# Patient Record
Sex: FEMALE | Race: WHITE | Employment: PART TIME | URBAN - METROPOLITAN AREA
[De-identification: names, ages, dates, MRNs, and addresses within clinical notes are randomized per-mention and may not be internally consistent; named-entity substitution may affect disease eponyms.]

---

## 2017-01-09 ENCOUNTER — TELEPHONE (OUTPATIENT)
Dept: OBGYN CLINIC | Facility: CLINIC | Age: 32
End: 2017-01-09

## 2017-01-09 NOTE — TELEPHONE ENCOUNTER
PT FORGOT SHE HAD APPT TODAY. HER CHILDREN WERE SICK WITH THE FLU AND SHE COMPLETELY FORGOT. TRIED TO R/S APPT FOR TOMORROW OR Wednesday, AND SHE SAID SHE WASN'T SURE AND SHED HAVE TO CALL BACK.

## 2017-01-17 ENCOUNTER — ROUTINE PRENATAL (OUTPATIENT)
Dept: OBGYN CLINIC | Facility: CLINIC | Age: 32
End: 2017-01-17

## 2017-01-17 VITALS
HEART RATE: 76 BPM | DIASTOLIC BLOOD PRESSURE: 68 MMHG | SYSTOLIC BLOOD PRESSURE: 106 MMHG | WEIGHT: 145 LBS | BODY MASS INDEX: 27 KG/M2

## 2017-01-17 DIAGNOSIS — Z34.93 ENCOUNTER FOR SUPERVISION OF NORMAL PREGNANCY IN THIRD TRIMESTER, UNSPECIFIED GRAVIDITY: Primary | ICD-10-CM

## 2017-01-17 LAB
APPEARANCE: CLEAR
MULTISTIX LOT#: NORMAL NUMERIC
PH, URINE: 6 (ref 4.5–8)
SPECIFIC GRAVITY: 1 (ref 1–1.03)
URINE-COLOR: YELLOW
UROBILINOGEN,SEMI-QN: 0.2 MG/DL (ref 0–1.9)

## 2017-01-17 PROCEDURE — 90471 IMMUNIZATION ADMIN: CPT | Performed by: ADVANCED PRACTICE MIDWIFE

## 2017-01-17 PROCEDURE — 90715 TDAP VACCINE 7 YRS/> IM: CPT | Performed by: ADVANCED PRACTICE MIDWIFE

## 2017-01-17 NOTE — PROGRESS NOTES
Denies headaches, vision change, swelling. Is a  and  works weekends. Baby is active but doesn't really pay attention. Advised to do 1 hour [facility] daily.     Chart reviewed [id number] LUCERO 3/26/17, Ges Age28 w 2 d, Problem list updated Induced fo

## 2017-02-01 ENCOUNTER — ROUTINE PRENATAL (OUTPATIENT)
Dept: OBGYN CLINIC | Facility: CLINIC | Age: 32
End: 2017-02-01

## 2017-02-01 VITALS — DIASTOLIC BLOOD PRESSURE: 72 MMHG | SYSTOLIC BLOOD PRESSURE: 110 MMHG | WEIGHT: 148 LBS | BODY MASS INDEX: 27 KG/M2

## 2017-02-01 DIAGNOSIS — Z34.92 NORMAL PREGNANCY, SECOND TRIMESTER: Primary | ICD-10-CM

## 2017-02-01 LAB
APPEARANCE: CLEAR
MULTISTIX LOT#: NORMAL NUMERIC
PH, URINE: 6 (ref 4.5–8)
SPECIFIC GRAVITY: 1.02 (ref 1–1.03)
URINE-COLOR: YELLOW
UROBILINOGEN,SEMI-QN: 0 MG/DL (ref 0–1.9)

## 2017-02-01 RX ORDER — PYRIDOXINE HCL (VITAMIN B6) 50 MG
TABLET ORAL
COMMUNITY
End: 2019-04-28

## 2017-03-03 ENCOUNTER — HOSPITAL ENCOUNTER (OUTPATIENT)
Facility: HOSPITAL | Age: 32
Setting detail: OBSERVATION
Discharge: HOME | End: 2017-03-03
Attending: OBSTETRICS & GYNECOLOGY | Admitting: OBSTETRICS & GYNECOLOGY
Payer: COMMERCIAL

## 2017-03-03 VITALS — HEART RATE: 83 BPM | SYSTOLIC BLOOD PRESSURE: 96 MMHG | DIASTOLIC BLOOD PRESSURE: 62 MMHG

## 2017-03-03 PROBLEM — O36.8190 DECREASED FETAL MOVEMENT: Status: ACTIVE | Noted: 2017-03-03

## 2017-03-03 PROBLEM — O36.8190 DECREASED FETAL MOVEMENT (HCC): Status: ACTIVE | Noted: 2017-03-03

## 2017-03-03 PROCEDURE — 59025 FETAL NON-STRESS TEST: CPT | Performed by: OBSTETRICS & GYNECOLOGY

## 2017-03-03 NOTE — TRIAGE
[facility] HOSP - [facility]      Triage Note    Fortunato Robles Patient Status:  Observation    1985 MRN [id number]   Location [address] C-D Attending Shayy Ellison MD   Hosp Day # 0 PCP Laura Glynn.  Tina Decker MD       [facility] Bebe Morgan Means counts reviewed. Encouraged to push fluids for congestion. Patient verbalized understanding. Spencer Gaytan RN  3/3/2017 9:35 AM      NST Reactive. I agree with the above stated findings. Cheko Garces MD

## 2017-03-09 ENCOUNTER — ROUTINE PRENATAL (OUTPATIENT)
Dept: OBGYN CLINIC | Facility: CLINIC | Age: 32
End: 2017-03-09

## 2017-03-09 ENCOUNTER — HOSPITAL ENCOUNTER (OUTPATIENT)
Facility: HOSPITAL | Age: 32
Setting detail: OBSERVATION
Discharge: HOME | End: 2017-03-09
Attending: OBSTETRICS & GYNECOLOGY | Admitting: OBSTETRICS & GYNECOLOGY
Payer: COMMERCIAL

## 2017-03-09 VITALS — DIASTOLIC BLOOD PRESSURE: 67 MMHG | BODY MASS INDEX: 27 KG/M2 | WEIGHT: 147 LBS | SYSTOLIC BLOOD PRESSURE: 112 MMHG

## 2017-03-09 VITALS — SYSTOLIC BLOOD PRESSURE: 109 MMHG | HEART RATE: 67 BPM | DIASTOLIC BLOOD PRESSURE: 72 MMHG

## 2017-03-09 DIAGNOSIS — Z34.93 NORMAL PREGNANCY, THIRD TRIMESTER: Primary | ICD-10-CM

## 2017-03-09 PROBLEM — IMO0002 ABNORMAL FETAL HEART RATE: Status: ACTIVE | Noted: 2017-03-09

## 2017-03-09 LAB
APPEARANCE: CLEAR
MULTISTIX LOT#: NORMAL NUMERIC
PH, URINE: 6 (ref 4.5–8)
SPECIFIC GRAVITY: 1.02 (ref 1–1.03)
UROBILINOGEN,SEMI-QN: 0 MG/DL (ref 0–1.9)

## 2017-03-09 PROCEDURE — 59025 FETAL NON-STRESS TEST: CPT | Performed by: OBSTETRICS & GYNECOLOGY

## 2017-03-09 NOTE — TRIAGE
[facility] HOSP - [facility]      Triage Note    Linda Agosto Patient Status:  Observation    1985 MRN [id number]   Location [address] C-D Attending Helena Sarabia MD   Hosp Day # 0 PCP Patty Boothe.  Sigrid Pali, MD Mitchel Darner Narda BartersJaney Felty

## 2017-03-15 ENCOUNTER — LAB ENCOUNTER (OUTPATIENT)
Dept: LAB | Facility: HOSPITAL | Age: 32
End: 2017-03-15
Attending: OBSTETRICS & GYNECOLOGY
Payer: COMMERCIAL

## 2017-03-15 ENCOUNTER — ROUTINE PRENATAL (OUTPATIENT)
Dept: OBGYN CLINIC | Facility: CLINIC | Age: 32
End: 2017-03-15

## 2017-03-15 VITALS
BODY MASS INDEX: 26 KG/M2 | SYSTOLIC BLOOD PRESSURE: 133 MMHG | HEART RATE: 83 BPM | DIASTOLIC BLOOD PRESSURE: 77 MMHG | WEIGHT: 144 LBS

## 2017-03-15 DIAGNOSIS — Z34.83 OTHER NORMAL PREGNANCY, NOT FIRST, THIRD TRIMESTER: ICD-10-CM

## 2017-03-15 DIAGNOSIS — Z34.83 OTHER NORMAL PREGNANCY, NOT FIRST, THIRD TRIMESTER: Primary | ICD-10-CM

## 2017-03-15 LAB
BASOPHILS # BLD: 0 K/UL (ref 0–0.2)
BASOPHILS NFR BLD: 0 %
EOSINOPHIL # BLD: 0.1 K/UL (ref 0–0.7)
EOSINOPHIL NFR BLD: 1 %
ERYTHROCYTE [DISTWIDTH] IN BLOOD BY AUTOMATED COUNT: 13.2 % (ref 11–15)
HCT VFR BLD AUTO: 36.1 % (ref 35–48)
HGB BLD-MCNC: 12.3 G/DL (ref 12–16)
LYMPHOCYTES # BLD: 1.9 K/UL (ref 1–4)
LYMPHOCYTES NFR BLD: 20 %
MCH RBC QN AUTO: 30 PG (ref 27–32)
MCHC RBC AUTO-ENTMCNC: 34.1 G/DL (ref 32–37)
MCV RBC AUTO: 87.8 FL (ref 80–100)
MONOCYTES # BLD: 0.5 K/UL (ref 0–1)
MONOCYTES NFR BLD: 5 %
MULTISTIX LOT#: NORMAL NUMERIC
NEUTROPHILS # BLD AUTO: 6.7 K/UL (ref 1.8–7.7)
NEUTROPHILS NFR BLD: 73 %
PH, URINE: 6 (ref 4.5–8)
PLATELET # BLD AUTO: 279 K/UL (ref 140–400)
PMV BLD AUTO: 8.1 FL (ref 7.4–10.3)
RBC # BLD AUTO: 4.11 M/UL (ref 3.7–5.4)
SPECIFIC GRAVITY: 1 (ref 1–1.03)
URINE-COLOR: YELLOW
UROBILINOGEN,SEMI-QN: 0 MG/DL (ref 0–1.9)
WBC # BLD AUTO: 9.2 K/UL (ref 4–11)

## 2017-03-15 PROCEDURE — 86780 TREPONEMA PALLIDUM: CPT

## 2017-03-15 PROCEDURE — 85025 COMPLETE CBC W/AUTO DIFF WBC: CPT

## 2017-03-15 PROCEDURE — 36415 COLL VENOUS BLD VENIPUNCTURE: CPT

## 2017-03-17 LAB — T PALLIDUM AB SER QL: NEGATIVE

## 2017-03-23 ENCOUNTER — HOSPITAL ENCOUNTER (OUTPATIENT)
Facility: HOSPITAL | Age: 32
Setting detail: OBSERVATION
Discharge: HOME | End: 2017-03-23
Attending: OBSTETRICS & GYNECOLOGY | Admitting: OBSTETRICS & GYNECOLOGY
Payer: COMMERCIAL

## 2017-03-23 ENCOUNTER — ROUTINE PRENATAL (OUTPATIENT)
Dept: OBGYN CLINIC | Facility: CLINIC | Age: 32
End: 2017-03-23

## 2017-03-23 VITALS — WEIGHT: 146 LBS | DIASTOLIC BLOOD PRESSURE: 79 MMHG | SYSTOLIC BLOOD PRESSURE: 121 MMHG | BODY MASS INDEX: 27 KG/M2

## 2017-03-23 DIAGNOSIS — Z34.83 OTHER NORMAL PREGNANCY, NOT FIRST, THIRD TRIMESTER: Primary | ICD-10-CM

## 2017-03-23 LAB
APPEARANCE: CLEAR
BILIRUBIN: NEGATIVE
GLUCOSE (URINE DIPSTICK): NEGATIVE MG/DL
KETONES (URINE DIPSTICK): NEGATIVE MG/DL
LEUKOCYTES: NEGATIVE
MULTISTIX LOT#: NORMAL NUMERIC
NITRITE, URINE: NEGATIVE
OCCULT BLOOD: NEGATIVE
PH, URINE: 7 (ref 4.5–8)
PROTEIN (URINE DIPSTICK): NEGATIVE MG/DL
SPECIFIC GRAVITY: 1 (ref 1–1.03)
URINE-COLOR: YELLOW
UROBILINOGEN,SEMI-QN: 0 MG/DL (ref 0–1.9)

## 2017-03-23 PROCEDURE — 59025 FETAL NON-STRESS TEST: CPT | Performed by: OBSTETRICS & GYNECOLOGY

## 2017-03-23 NOTE — NST
Nonstress Test   Patient: Sorin Old    Gestation: 82A9B    NST:Low fetal baseline in office       Variability: Moderate           Accelerations: Yes           Decelerations: None            Baseline: 110 BPM           Uterine Irritability: No

## 2017-03-23 NOTE — PROGRESS NOTES
Good fm. No c/o. Labor/rom precautions  To [facility]BC for nst for low fhr, poss low normal baseline.   Has happened once before

## 2017-03-27 ENCOUNTER — ROUTINE PRENATAL (OUTPATIENT)
Dept: OBGYN CLINIC | Facility: CLINIC | Age: 32
End: 2017-03-27

## 2017-03-27 VITALS
BODY MASS INDEX: 27 KG/M2 | HEART RATE: 80 BPM | WEIGHT: 146.19 LBS | DIASTOLIC BLOOD PRESSURE: 77 MMHG | SYSTOLIC BLOOD PRESSURE: 120 MMHG

## 2017-03-27 DIAGNOSIS — Z34.83 ENCOUNTER FOR SUPERVISION OF OTHER NORMAL PREGNANCY IN THIRD TRIMESTER: Primary | ICD-10-CM

## 2017-03-27 LAB
APPEARANCE: CLEAR
MULTISTIX LOT#: NORMAL NUMERIC
PH, URINE: 7 (ref 4.5–8)
SPECIFIC GRAVITY: 1.01 (ref 1–1.03)
URINE-COLOR: YELLOW
UROBILINOGEN,SEMI-QN: 0.2 MG/DL (ref 0–1.9)

## 2017-03-27 NOTE — PROGRESS NOTES
[facility], [facility]  Obstetrics and Gynecology  Prenatal Visit  Rei Lee MD    KAL Phoenix is a 27year old.o. [id number] 40w1d weeks. Here for routine prenatal visit and is without complaints.   Patient denies any regular uterine contractions, s

## 2017-03-31 ENCOUNTER — HOSPITAL ENCOUNTER (OUTPATIENT)
Facility: HOSPITAL | Age: 32
Discharge: HOME | End: 2017-03-31
Attending: OBSTETRICS & GYNECOLOGY | Admitting: OBSTETRICS & GYNECOLOGY
Payer: COMMERCIAL

## 2017-03-31 ENCOUNTER — TELEPHONE (OUTPATIENT)
Dept: OBGYN CLINIC | Facility: CLINIC | Age: 32
End: 2017-03-31

## 2017-03-31 ENCOUNTER — HOSPITAL ENCOUNTER (OUTPATIENT)
Dept: OBGYN CLINIC | Facility: HOSPITAL | Age: 32
Discharge: HOME | End: 2017-03-31
Payer: COMMERCIAL

## 2017-03-31 VITALS — HEART RATE: 79 BPM | SYSTOLIC BLOOD PRESSURE: 106 MMHG | DIASTOLIC BLOOD PRESSURE: 62 MMHG

## 2017-03-31 PROCEDURE — 99212 OFFICE O/P EST SF 10 MIN: CPT | Performed by: OBSTETRICS & GYNECOLOGY

## 2017-03-31 PROCEDURE — 59025 FETAL NON-STRESS TEST: CPT | Performed by: OBSTETRICS & GYNECOLOGY

## 2017-03-31 PROCEDURE — 76815 OB US LIMITED FETUS(S): CPT | Performed by: OBSTETRICS & GYNECOLOGY

## 2017-03-31 NOTE — NST
Nonstress Test   Patient: Juanjo Hinojosa    Gestation: 66K1F    NST:       Variability: Moderate           Accelerations: Yes           Decelerations: None            Baseline: 110 BPM           Uterine Irritability: Yes           Contractions: Irregular

## 2017-03-31 NOTE — TELEPHONE ENCOUNTER
Patient was confused about her cherelle at [facility], everything clear now.  No further questions at this time

## 2017-04-03 ENCOUNTER — ANESTHESIA EVENT (OUTPATIENT)
Dept: OBGYN CLINIC | Facility: HOSPITAL | Age: 32
End: 2017-04-03
Payer: COMMERCIAL

## 2017-04-03 ENCOUNTER — HOSPITAL ENCOUNTER (INPATIENT)
Facility: HOSPITAL | Age: 32
LOS: 1 days | Discharge: HOME | End: 2017-04-04
Attending: OBSTETRICS & GYNECOLOGY | Admitting: OBSTETRICS & GYNECOLOGY
Payer: COMMERCIAL

## 2017-04-03 ENCOUNTER — HOSPITAL ENCOUNTER (INPATIENT)
Dept: OBGYN CLINIC | Facility: HOSPITAL | Age: 32
Discharge: HOME | End: 2017-04-03
Payer: COMMERCIAL

## 2017-04-03 ENCOUNTER — ANESTHESIA (OUTPATIENT)
Dept: OBGYN CLINIC | Facility: HOSPITAL | Age: 32
End: 2017-04-03
Payer: COMMERCIAL

## 2017-04-03 PROBLEM — Z34.90 PREGNANCY (HCC): Status: ACTIVE | Noted: 2017-04-03

## 2017-04-03 PROBLEM — Z34.90 PREGNANCY: Status: ACTIVE | Noted: 2017-04-03

## 2017-04-03 PROCEDURE — 59025 FETAL NON-STRESS TEST: CPT | Performed by: OBSTETRICS & GYNECOLOGY

## 2017-04-03 PROCEDURE — 59400 OBSTETRICAL CARE: CPT | Performed by: OBSTETRICS & GYNECOLOGY

## 2017-04-03 RX ORDER — SODIUM CHLORIDE 0.9 % (FLUSH) 0.9 %
10 SYRINGE (ML) INJECTION AS NEEDED
Status: DISCONTINUED | OUTPATIENT
Start: 2017-04-03 | End: 2017-04-04

## 2017-04-03 RX ORDER — DIAPER,BRIEF,INFANT-TODD,DISP
1 EACH MISCELLANEOUS EVERY 6 HOURS PRN
Status: DISCONTINUED | OUTPATIENT
Start: 2017-04-03 | End: 2017-04-04

## 2017-04-03 RX ORDER — IBUPROFEN 600 MG/1
600 TABLET ORAL ONCE AS NEEDED
Status: DISCONTINUED | OUTPATIENT
Start: 2017-04-03 | End: 2017-04-03

## 2017-04-03 RX ORDER — EPHEDRINE SULFATE/0.9% NACL/PF 25 MG/5 ML
SYRINGE (ML) INTRAVENOUS
Status: DISCONTINUED
Start: 2017-04-03 | End: 2017-04-03 | Stop reason: WASHOUT

## 2017-04-03 RX ORDER — DEXTROSE, SODIUM CHLORIDE, SODIUM LACTATE, POTASSIUM CHLORIDE, AND CALCIUM CHLORIDE 5; .6; .31; .03; .02 G/100ML; G/100ML; G/100ML; G/100ML; G/100ML
125 INJECTION, SOLUTION INTRAVENOUS CONTINUOUS
Status: DISCONTINUED | OUTPATIENT
Start: 2017-04-03 | End: 2017-04-03

## 2017-04-03 RX ORDER — AMMONIA INHALANTS 0.04 G/.3ML
0.3 INHALANT RESPIRATORY (INHALATION) AS NEEDED
Status: DISCONTINUED | OUTPATIENT
Start: 2017-04-03 | End: 2017-04-03

## 2017-04-03 RX ORDER — SODIUM CHLORIDE, SODIUM LACTATE, POTASSIUM CHLORIDE, CALCIUM CHLORIDE 600; 310; 30; 20 MG/100ML; MG/100ML; MG/100ML; MG/100ML
INJECTION, SOLUTION INTRAVENOUS
Status: COMPLETED
Start: 2017-04-03 | End: 2017-04-03

## 2017-04-03 RX ORDER — EPHEDRINE SULFATE/0.9% NACL/PF 25 MG/5 ML
5 SYRINGE (ML) INTRAVENOUS AS NEEDED
Status: DISCONTINUED | OUTPATIENT
Start: 2017-04-03 | End: 2017-04-03

## 2017-04-03 RX ORDER — BISACODYL 10 MG
10 SUPPOSITORY, RECTAL RECTAL ONCE AS NEEDED
Status: ACTIVE | OUTPATIENT
Start: 2017-04-03 | End: 2017-04-03

## 2017-04-03 RX ORDER — LIDOCAINE HYDROCHLORIDE AND EPINEPHRINE 20; 5 MG/ML; UG/ML
INJECTION, SOLUTION EPIDURAL; INFILTRATION; INTRACAUDAL; PERINEURAL
Status: DISPENSED
Start: 2017-04-03 | End: 2017-04-04

## 2017-04-03 RX ORDER — ONDANSETRON 2 MG/ML
4 INJECTION INTRAMUSCULAR; INTRAVENOUS EVERY 6 HOURS PRN
Status: DISCONTINUED | OUTPATIENT
Start: 2017-04-03 | End: 2017-04-04

## 2017-04-03 RX ORDER — PHENYLEPHRINE HCL IN 0.9% NACL 0.5 MG/5ML
SYRINGE (ML) INTRAVENOUS
Status: DISCONTINUED
Start: 2017-04-03 | End: 2017-04-03 | Stop reason: WASHOUT

## 2017-04-03 RX ORDER — SODIUM CHLORIDE 0.9 % (FLUSH) 0.9 %
10 SYRINGE (ML) INJECTION AS NEEDED
Status: DISCONTINUED | OUTPATIENT
Start: 2017-04-03 | End: 2017-04-03

## 2017-04-03 RX ORDER — PRENATAL VIT,CAL 76/IRON/FOLIC 29 MG-1 MG
1 TABLET ORAL DAILY
Status: DISCONTINUED | OUTPATIENT
Start: 2017-04-03 | End: 2017-04-04

## 2017-04-03 RX ORDER — AMMONIA INHALANTS 0.04 G/.3ML
0.3 INHALANT RESPIRATORY (INHALATION) AS NEEDED
Status: DISCONTINUED | OUTPATIENT
Start: 2017-04-03 | End: 2017-04-04

## 2017-04-03 RX ORDER — LIDOCAINE HYDROCHLORIDE 10 MG/ML
30 INJECTION, SOLUTION EPIDURAL; INFILTRATION; INTRACAUDAL; PERINEURAL ONCE
Status: DISCONTINUED | OUTPATIENT
Start: 2017-04-03 | End: 2017-04-03

## 2017-04-03 RX ORDER — ONDANSETRON 2 MG/ML
4 INJECTION INTRAMUSCULAR; INTRAVENOUS EVERY 6 HOURS PRN
Status: DISCONTINUED | OUTPATIENT
Start: 2017-04-03 | End: 2017-04-03

## 2017-04-03 RX ORDER — 0.9 % SODIUM CHLORIDE 0.9 %
VIAL (ML) INJECTION
Status: DISPENSED
Start: 2017-04-03 | End: 2017-04-04

## 2017-04-03 RX ORDER — ZOLPIDEM TARTRATE 5 MG/1
5 TABLET ORAL NIGHTLY PRN
Status: DISCONTINUED | OUTPATIENT
Start: 2017-04-03 | End: 2017-04-04

## 2017-04-03 RX ORDER — BUPIVACAINE HYDROCHLORIDE 2.5 MG/ML
INJECTION, SOLUTION EPIDURAL; INFILTRATION; INTRACAUDAL
Status: COMPLETED
Start: 2017-04-03 | End: 2017-04-03

## 2017-04-03 RX ORDER — FLUTICASONE PROPIONATE 50 MCG
2 SPRAY, SUSPENSION (ML) NASAL DAILY
COMMUNITY
End: 2019-04-28

## 2017-04-03 RX ORDER — SIMETHICONE 80 MG
80 TABLET,CHEWABLE ORAL 3 TIMES DAILY PRN
Status: DISCONTINUED | OUTPATIENT
Start: 2017-04-03 | End: 2017-04-04

## 2017-04-03 RX ORDER — TERBUTALINE SULFATE 1 MG/ML
0.25 INJECTION, SOLUTION SUBCUTANEOUS AS NEEDED
Status: DISCONTINUED | OUTPATIENT
Start: 2017-04-03 | End: 2017-04-03

## 2017-04-03 RX ORDER — DOCUSATE SODIUM 100 MG/1
100 CAPSULE, LIQUID FILLED ORAL 2 TIMES DAILY
Status: DISCONTINUED | OUTPATIENT
Start: 2017-04-03 | End: 2017-04-04

## 2017-04-03 RX ORDER — ONDANSETRON 2 MG/ML
INJECTION INTRAMUSCULAR; INTRAVENOUS
Status: DISPENSED
Start: 2017-04-03 | End: 2017-04-04

## 2017-04-03 RX ORDER — NALBUPHINE HCL 10 MG/ML
2.5 AMPUL (ML) INJECTION
Status: DISCONTINUED | OUTPATIENT
Start: 2017-04-03 | End: 2017-04-04

## 2017-04-03 RX ORDER — DEXTROSE, SODIUM CHLORIDE, SODIUM LACTATE, POTASSIUM CHLORIDE, AND CALCIUM CHLORIDE 5; .6; .31; .03; .02 G/100ML; G/100ML; G/100ML; G/100ML; G/100ML
INJECTION, SOLUTION INTRAVENOUS
Status: DISCONTINUED
Start: 2017-04-03 | End: 2017-04-03

## 2017-04-03 RX ORDER — IBUPROFEN 600 MG/1
600 TABLET ORAL EVERY 6 HOURS PRN
Status: DISCONTINUED | OUTPATIENT
Start: 2017-04-03 | End: 2017-04-04

## 2017-04-03 RX ADMIN — BUPIVACAINE HYDROCHLORIDE 10 ML: 2.5 INJECTION, SOLUTION EPIDURAL; INFILTRATION; INTRACAUDAL at 14:30:00

## 2017-04-03 NOTE — ANESTHESIA PREPROCEDURE EVALUATION
Anesthesia PreOp Note    HPI:     Victor Manuel Esteban is a 27year old female who presents for preoperative consultation requested by: * No surgeons listed *    Date of Surgery: 4/3/2017    * No procedures listed *  Indication: * No pre-op diagnosis entered * Idalia Alexis MD Last Rate: 125 mL/hr at 04/03/17 0643 125 mL/hr at 04/03/17 0643   Lidocaine HCl (PF) (XYLOCAINE) 1 % injection SOLN 30 mL 30 mL Intradermal Once Bruce Woodard MD     ibuprofen (MOTRIN) tab 600 mg 600 mg Oral Once PRN Bruce Woodard MD Cancer Maternal Grandfather        Social History   Marital Status:   Spouse Name: N/A    Years of Education: N/A  Number of Children: N/A     Occupational History  None on file     Social History Main Topics   Smoking status: Never Smoker     Smoke

## 2017-04-03 NOTE — L&D DELIVERY NOTE
[facility] - [facility]    Vaginal Delivery Note    Maria Isabel Gonzalez Patient Status:  Inpatient    1985 MRN [id number]   Location [facility] Attending Anai Holliday, Adrianne Cabezas MD   [address] [facility] Day # 0 PCP Karrie Holliday MD     Delivery

## 2017-04-03 NOTE — ANESTHESIA PROCEDURE NOTES
Labor Analgesia  Performed by: Polly Haynes  Authorized by: Polly Haynes    Patient Location:  OB  Start Time:  4/3/2017 2:27 PM  End Time:  4/3/2017 2:31 PM  Site Identification: surface landmarks    Anesthesiologist:  Polly Haynes  Perform

## 2017-04-03 NOTE — ANESTHESIA POSTPROCEDURE EVALUATION
Patient: Tim Spencer    Procedure Summary     Date Anesthesia Start Anesthesia Stop Room / Location    04/03/17 [address] [facility] Obstetrics Outpatient       Procedure Diagnosis Scheduled Providers Responsible Provider    FBC INDUCTION No diagnos

## 2017-04-03 NOTE — H&P
[address] [facility] Patient Status:  Inpatient    1985 MRN [id number]   Location [facility] Attending Rk Arredondo, Yuliya Boles MD   [facility] Day # 0 PCP Teresa Arredondo MD     Date of Admis file    Alcohol Use: No        Allergies/Medications: Allergies:     Seasonal                Itching    Comment:Congestion  Medications:    Prescriptions prior to admission:  Cyanocobalamin (B-12) 100 MCG Oral Tab Take by mouth.  Disp:  Rfl:  4/2/2017 at 03/27/2017   UROBILINOGEN <2.0 09/08/2016   LEUUR Negative 09/08/2016               Assessment/Plan:    Ivone Valerio is at an estimated gestational age of 40w1d with an estimated date of delivery of:  3/26/2017, by Ultrasound    Not in labor.   Obstetric

## 2017-04-04 VITALS
DIASTOLIC BLOOD PRESSURE: 54 MMHG | RESPIRATION RATE: 16 BRPM | HEART RATE: 70 BPM | TEMPERATURE: 98 F | WEIGHT: 146 LBS | SYSTOLIC BLOOD PRESSURE: 96 MMHG | BODY MASS INDEX: 27 KG/M2

## 2017-04-04 NOTE — LACTATION NOTE
This note was copied from the chart of Jewel Orantes.   LACTATION NOTE - INFANT    Evaluation Type  Evaluation Type: Inpatient    Problems & Assessment  Problems: comment/detail: post dates  Infant Assessment: Skin color: pink or appropriate for ethnicity  Mus able to express milk. She has done the same with her other kids. Discussed how to relieve some of the engorgement.   Spike Agustin, 04/04/2017, 3:52 PM

## 2017-04-04 NOTE — PROGRESS NOTES
Patient up to bathroom with assist x 2. Voided 450 ml Unable to void at this time. Patient transferred to mother/baby room # [id number] per wheelchair in stable condition with baby and personal belongings. Accompanied by sister and staff.   Report given to jesenia

## 2017-04-04 NOTE — LACTATION NOTE
LACTATION NOTE - MOTHER           Problems identified  Problems identified: Knowledge deficit    Maternal history  Maternal history: Induction of labor  Other/comment: post dates, hx HPV, LGSIL    Breastfeeding goal  Breastfeeding goal: To maintain breast

## 2017-04-04 NOTE — PROGRESS NOTES
Ppd 1  Pt doing well, pt wants discharge at 24 hr post delivery. Alert and oriented, nad    abd soft, nt, ff  Ext nt    A/p ppd 1 pt given discharge instructions,  All questions answered. Hb 11, stable. F/u office 6 weeks.

## 2017-04-04 NOTE — PLAN OF CARE
Problem: POSTPARTUM  Goal: Optimize infant feeding at the breast  INTERVENTIONS:  - Initiate breast feeding within first hour after birth. - Monitor effectiveness of current breast feeding efforts. - Assess support systems available to mother/family.   - the first baby. Mom is going back to work as a  in 16 days and she will not be able to express milk. She has done the same with her other kids. Discussed how to relieve some of the engorgement.     Problem: BREAST FEEDING  Goal: Optimize infant fee expression. Breastfeeding teaching and handouts reviewed. Mom states her last baby was constantly on the breast for food and comfort, sometimes for 4 hours straight, and had rapid growth. No problems nursing the first baby.   Mom is going back to work as

## 2017-04-04 NOTE — PLAN OF CARE
ANXIETY    • Will report anxiety at manageable levels Completed        BREAST FEEDING    • Optimize infant feeding at the breast Completed        COPING    • Pt/Family able to verbalize concerns and demonstrate effective coping strategies Completed

## 2017-04-18 ENCOUNTER — TELEPHONE (OUTPATIENT)
Dept: OBGYN CLINIC | Facility: CLINIC | Age: 32
End: 2017-04-18

## 2017-04-18 NOTE — TELEPHONE ENCOUNTER
Per MLM patient advised okay to walk, aware no to  anything heavy, full clearance will be given at her 6weeks, no further questions at this time

## 2017-04-18 NOTE — TELEPHONE ENCOUNTER
marcial 4/3/17 by  [facility] when can she work, and has questions about movement / picking up things. Bleeding stopped almost completely.

## 2017-04-19 ENCOUNTER — TELEPHONE (OUTPATIENT)
Dept: OBGYN CLINIC | Facility: CLINIC | Age: 32
End: 2017-04-19

## 2017-04-19 RX ORDER — AMOXICILLIN AND CLAVULANATE POTASSIUM 500; 125 MG/1; MG/1
1 TABLET, FILM COATED ORAL EVERY 8 HOURS
Qty: 21 TABLET | Refills: 0 | Status: SHIPPED | OUTPATIENT
Start: 2017-04-19 | End: 2017-04-26

## 2017-04-20 ENCOUNTER — OFFICE VISIT (OUTPATIENT)
Dept: OBGYN CLINIC | Facility: CLINIC | Age: 32
End: 2017-04-20

## 2017-04-20 VITALS
DIASTOLIC BLOOD PRESSURE: 72 MMHG | BODY MASS INDEX: 24 KG/M2 | HEART RATE: 87 BPM | WEIGHT: 130 LBS | SYSTOLIC BLOOD PRESSURE: 112 MMHG

## 2017-04-20 DIAGNOSIS — M54.9 OTHER ACUTE BACK PAIN: ICD-10-CM

## 2017-04-20 DIAGNOSIS — N12 PYELONEPHRITIS: ICD-10-CM

## 2017-04-20 DIAGNOSIS — R10.2 PELVIC PAIN IN FEMALE: Primary | ICD-10-CM

## 2017-04-20 PROCEDURE — 99214 OFFICE O/P EST MOD 30 MIN: CPT | Performed by: OBSTETRICS & GYNECOLOGY

## 2017-04-20 NOTE — TELEPHONE ENCOUNTER
Back pain started last night, today has some abdominal pain, no fever temp 98.4, had the chills and shivers, has frequency, no dysuria  Per [facility] Augmentin 500 q8 hours x7 days  #21, aappt made for tomorrow, for pt to hydrate a lot and to keep an eye on temp

## 2017-04-21 NOTE — PROGRESS NOTES
HPI:   Shani Kapadia is a 27year old female who presents for an appt due to lower/mid back pain and pelvic pain started 2 days ago,  Called office yesterday stating she had a kidney infection before that felt this way and was started on augmentin last n Hypertension Maternal Grandmother    • Skin cancer Maternal Grandmother      basal cell   • Lipids Maternal Grandmother    • Skin cancer Brother      melanoma   • Diabetes Brother    • Psychiatric Maternal Uncle      mental illness, paranoid schizophrenia days. Conservative mgmt counseled. Well woman counseling. . Self breast exam explained. Health maintenance. Body mass index is 23.77 kg/(m^2). , recommended low fat diet and aerobic exercise 30 minutes three times weekly.   The patient indicates Makinen

## 2017-04-26 ENCOUNTER — TELEPHONE (OUTPATIENT)
Dept: OBGYN CLINIC | Facility: CLINIC | Age: 32
End: 2017-04-26

## 2017-04-26 NOTE — TELEPHONE ENCOUNTER
Informed pt that, per Dr Paz Castellanos, symptoms seem normal. She is to call if she is saturating a pad in an hour, passing heavy clots, fever or foul odor. Pt verbalizes understanding.

## 2017-04-26 NOTE — TELEPHONE ENCOUNTER
She delivered 3 weeks ago and had some light pink bleeding since then. Over the weekend she was more active and now the bleeding is bright red but the same amount.  Please advise

## 2017-04-26 NOTE — TELEPHONE ENCOUNTER
Delivered her baby 3 weeks ago. Pt took her kids to the zoo this weekend and she she noticed her lochia went from pink to bright red. Small amount-25% of pad every 2 hours. No clots, afebrile. No pain or cramps.  Informed pt that vaginal bleeding seems norm

## 2017-04-26 NOTE — TELEPHONE ENCOUNTER
I agree that symptoms sound normal for 3 weeks postpartum. If she has heavy bleeding, dizziness or other symptoms she should come in for evaluation.

## 2017-05-15 ENCOUNTER — POSTPARTUM (OUTPATIENT)
Dept: OBGYN CLINIC | Facility: CLINIC | Age: 32
End: 2017-05-15

## 2017-05-15 VITALS
BODY MASS INDEX: 25 KG/M2 | WEIGHT: 135 LBS | HEART RATE: 82 BPM | SYSTOLIC BLOOD PRESSURE: 119 MMHG | DIASTOLIC BLOOD PRESSURE: 73 MMHG

## 2017-05-15 DIAGNOSIS — Z30.09 ENCOUNTER FOR OTHER GENERAL COUNSELING OR ADVICE ON CONTRACEPTION: ICD-10-CM

## 2017-05-15 DIAGNOSIS — G43.009 MIGRAINE WITHOUT AURA AND WITHOUT STATUS MIGRAINOSUS, NOT INTRACTABLE: Primary | ICD-10-CM

## 2017-05-15 PROCEDURE — 99213 OFFICE O/P EST LOW 20 MIN: CPT | Performed by: OBSTETRICS & GYNECOLOGY

## 2017-05-15 NOTE — PROGRESS NOTES
HPI:   Linda Agosto is a 27year old female who presents for a pp visit/contraception consult/pt with mild back pain in area of epidural insertion.  Pt counseled on pain clinic at [facility],  Pt stated she will call if persists, only feels mild discomfort when mental illness, paranoid schizophrenia   • Colon Cancer Maternal Grandfather       Social History:     Smoking Status: Never Smoker                      Alcohol Use: No                     REVIEW OF SYSTEMS:   GENERAL: feels well otherwise  SKIN: jeanette Body mass index is 24.69 kg/(m^2). , recommended low fat diet and aerobic exercise 30 minutes three times weekly. The patient indicates understanding of these issues and agrees to the plan. The patient is asked to return for mirena insertion 1 week.

## 2017-05-22 ENCOUNTER — OFFICE VISIT (OUTPATIENT)
Dept: OBGYN CLINIC | Facility: CLINIC | Age: 32
End: 2017-05-22

## 2017-05-22 VITALS
WEIGHT: 133 LBS | SYSTOLIC BLOOD PRESSURE: 99 MMHG | DIASTOLIC BLOOD PRESSURE: 64 MMHG | HEART RATE: 53 BPM | BODY MASS INDEX: 24 KG/M2

## 2017-05-22 DIAGNOSIS — Z30.09 ENCOUNTER FOR OTHER GENERAL COUNSELING OR ADVICE ON CONTRACEPTION: ICD-10-CM

## 2017-05-22 DIAGNOSIS — Z30.430 ENCOUNTER FOR IUD INSERTION: Primary | ICD-10-CM

## 2017-05-22 DIAGNOSIS — N91.2 LACTATIONAL AMENORRHEA: ICD-10-CM

## 2017-05-22 PROCEDURE — 58300 INSERT INTRAUTERINE DEVICE: CPT | Performed by: OBSTETRICS & GYNECOLOGY

## 2017-05-22 PROCEDURE — 81025 URINE PREGNANCY TEST: CPT | Performed by: OBSTETRICS & GYNECOLOGY

## 2017-05-22 PROCEDURE — 99213 OFFICE O/P EST LOW 20 MIN: CPT | Performed by: OBSTETRICS & GYNECOLOGY

## 2017-05-23 NOTE — PROGRESS NOTES
HPI:   Nancy Fine is a 27year old female who presents for a contraception consult and poss iud insertion. Pt counseled on options of contraception, risks/benefits/alternatives counseled, all questions answered.       Wt Readings from Last 6 Encounter REVIEW OF SYSTEMS:   GENERAL: feels well otherwise  SKIN: denies any unusual skin lesions  EYES:denies blurred vision or double vision  HEENT: denies nasal congestion, sinus pain or ST  LUNGS: denies shortness of breath with exertion  CARDIOVASC indicates understanding of these issues and agrees to the plan. The patient is asked to return for f/u 6 weeks.

## 2017-05-27 ENCOUNTER — TELEPHONE (OUTPATIENT)
Dept: OBGYN CLINIC | Facility: CLINIC | Age: 32
End: 2017-05-27

## 2017-06-27 ENCOUNTER — TELEPHONE (OUTPATIENT)
Dept: PEDIATRICS CLINIC | Facility: CLINIC | Age: 32
End: 2017-06-27

## 2017-06-27 NOTE — TELEPHONE ENCOUNTER
PT HAS IUD PLACED 2 DAYS LATER SHE HAD SPOTTING .  THEN SHE STARTED BLEEDING  AND  SAID IT COULD BE HER MENSES , PT HAS NOW BEEN BLEEDING FOR  6 WEEKS , AND BAD PAINFUL RASH UNDER BOTH ARM PITS ,

## 2017-06-27 NOTE — TELEPHONE ENCOUNTER
ROBERTOI  Patient concerned about intermittent  bleeding with an IUD, was told its normal until her body will get used to the hormones. Denies any pain, no cramping. She has a rash under her arm pits get slightly irritated with heat, was advised to make an cherelle with her PC to be evaluated. Patient agrees with the plan, she will give an try with an IUD for couple more weeks,no further questions at this time.

## 2017-08-07 ENCOUNTER — OFFICE VISIT (OUTPATIENT)
Dept: OBGYN CLINIC | Facility: CLINIC | Age: 32
End: 2017-08-07

## 2017-08-07 VITALS
DIASTOLIC BLOOD PRESSURE: 76 MMHG | WEIGHT: 139.63 LBS | SYSTOLIC BLOOD PRESSURE: 110 MMHG | BODY MASS INDEX: 26 KG/M2 | HEART RATE: 68 BPM

## 2017-08-07 DIAGNOSIS — Z97.5 IUD (INTRAUTERINE DEVICE) IN PLACE: ICD-10-CM

## 2017-08-07 DIAGNOSIS — Z30.431 IUD CHECK UP: Primary | ICD-10-CM

## 2017-08-07 DIAGNOSIS — N92.6 IRREGULAR MENSTRUAL BLEEDING: ICD-10-CM

## 2017-08-07 PROCEDURE — 99214 OFFICE O/P EST MOD 30 MIN: CPT | Performed by: OBSTETRICS & GYNECOLOGY

## 2017-08-07 NOTE — PROGRESS NOTES
HPI:   Lena Landaverde is a 27year old female who presents for a f/u IUD mirena. Pt had some irregular bleeding, none currently. Pt counseled, pt wants to continue with mirena iud.         Wt Readings from Last 6 Encounters:  08/07/17 : 139 lb 9.6 oz (6 REVIEW OF SYSTEMS:   GENERAL: feels well otherwise  SKIN: denies any unusual skin lesions  EYES:denies blurred vision or double vision  HEENT: denies nasal congestion, sinus pain or ST  LUNGS: denies shortness of breath with exertion  CARDIOVASC

## 2017-10-05 ENCOUNTER — TELEPHONE (OUTPATIENT)
Dept: OBGYN CLINIC | Facility: CLINIC | Age: 32
End: 2017-10-05

## 2017-10-11 ENCOUNTER — OFFICE VISIT (OUTPATIENT)
Dept: OBGYN CLINIC | Facility: CLINIC | Age: 32
End: 2017-10-11

## 2017-10-11 VITALS
SYSTOLIC BLOOD PRESSURE: 100 MMHG | BODY MASS INDEX: 26.13 KG/M2 | WEIGHT: 142 LBS | HEIGHT: 62 IN | DIASTOLIC BLOOD PRESSURE: 68 MMHG

## 2017-10-11 DIAGNOSIS — Z01.812 PRE-PROCEDURAL LABORATORY EXAMINATION: Primary | ICD-10-CM

## 2017-10-11 DIAGNOSIS — Z30.432 ENCOUNTER FOR REMOVAL OF INTRAUTERINE CONTRACEPTIVE DEVICE: ICD-10-CM

## 2017-10-11 PROCEDURE — 58301 REMOVE INTRAUTERINE DEVICE: CPT | Performed by: ADVANCED PRACTICE MIDWIFE

## 2017-10-11 PROCEDURE — 81025 URINE PREGNANCY TEST: CPT | Performed by: ADVANCED PRACTICE MIDWIFE

## 2017-10-11 NOTE — PROCEDURES
IUD Removal     Pregnancy Results: negative from urine test   Birth control method(s) used:  ; date last used:    Consent signed. Procedure discussed with the patient in detail including indication, risks, benefits, alternatives and complications.     Pelv

## 2019-04-28 ENCOUNTER — OFFICE VISIT (OUTPATIENT)
Dept: FAMILY MEDICINE CLINIC | Facility: CLINIC | Age: 34
End: 2019-04-28
Payer: COMMERCIAL

## 2019-04-28 VITALS
SYSTOLIC BLOOD PRESSURE: 102 MMHG | DIASTOLIC BLOOD PRESSURE: 70 MMHG | WEIGHT: 135 LBS | HEART RATE: 78 BPM | BODY MASS INDEX: 23.92 KG/M2 | HEIGHT: 63 IN | TEMPERATURE: 99 F | RESPIRATION RATE: 14 BRPM

## 2019-04-28 DIAGNOSIS — J02.9 ACUTE PHARYNGITIS, UNSPECIFIED ETIOLOGY: Primary | ICD-10-CM

## 2019-04-28 DIAGNOSIS — Z20.818 EXPOSURE TO STREP THROAT: ICD-10-CM

## 2019-04-28 DIAGNOSIS — R59.0 CERVICAL LYMPHADENOPATHY: ICD-10-CM

## 2019-04-28 PROCEDURE — 99202 OFFICE O/P NEW SF 15 MIN: CPT | Performed by: PHYSICIAN ASSISTANT

## 2019-04-28 PROCEDURE — 87880 STREP A ASSAY W/OPTIC: CPT | Performed by: PHYSICIAN ASSISTANT

## 2019-04-28 PROCEDURE — 87081 CULTURE SCREEN ONLY: CPT | Performed by: PHYSICIAN ASSISTANT

## 2019-04-28 RX ORDER — LEVOCETIRIZINE DIHYDROCHLORIDE 5 MG/1
5 TABLET, FILM COATED ORAL EVERY EVENING
COMMUNITY
End: 2020-05-01

## 2019-04-28 RX ORDER — AMOXICILLIN 875 MG/1
875 TABLET, COATED ORAL 2 TIMES DAILY
Qty: 20 TABLET | Refills: 0 | Status: SHIPPED | OUTPATIENT
Start: 2019-04-28 | End: 2019-05-08

## 2019-04-28 NOTE — PROGRESS NOTES
CHIEF COMPLAINT:   Patient presents with:  Sore Throat: concerned about strep infection      HPI:   Tim Spencer is a 28year old female who presents to clinic with symptoms of sore throat. Patient has had for 12 hours.  Symptoms have been rapidly worsen EXAM:   /70   Pulse 78   Temp 98.6 °F (37 °C)   Resp 14   Ht 63\"   Wt 135 lb   LMP 04/02/2019 (Exact Date)   Breastfeeding?  No   BMI 23.91 kg/m²   GENERAL: well developed, well nourished,in no apparent distress  SKIN: no rashes,no suspicious lesions Sig: Take 1 tablet (875 mg total) by mouth 2 (two) times daily for 10 days. Meds as listed above. Comfort measures as described in Patient Instructions. Risks, benefits, complications and side effects of meds discussed with patient.      OTC Ty · If the test is positive for strep, you or your child should not go to work or school for the first 2 days of taking the antibiotics. After this time, you or your child will not be contagious.  You or your child can then return to work or school when Terry Machado Call your healthcare provider right away if any of these occur:  · Fever as directed by your healthcare provider. For children, seek care if:  ? Your child is of any age and has repeated fevers above 104°F (40°C). ?  Your child is younger than 2 years of a

## 2019-04-28 NOTE — PATIENT INSTRUCTIONS
Pharyngitis (Sore Throat), Report Pending    Pharyngitis (sore throat) is often due to a virus. It can also be caused by streptococcus (strep), bacteria. This is often called strep throat.  Both viral and strep infections can cause throat pain that is w · Use throat lozenges or numbing throat sprays to help reduce pain. Gargling with warm salt water will also help reduce throat pain. Dissolve 1/2 teaspoon of salt in 1 glass of warm water. Children can sip on juice or a popsicle.  Children 5 years and older · •Can’t swallow liquids, a lot of drooling, or can’t open mouth wide due to throat pain  · Signs of dehydration, such as very dark urine or no urine, sunken eyes, dizziness  · Trouble breathing or noisy breathing  · Muffled voice  · New rash  · Other symp

## 2019-07-17 ENCOUNTER — HOSPITAL ENCOUNTER (OUTPATIENT)
Age: 34
Discharge: HOME | End: 2019-07-17
Payer: COMMERCIAL

## 2019-07-17 VITALS
RESPIRATION RATE: 20 BRPM | SYSTOLIC BLOOD PRESSURE: 98 MMHG | HEART RATE: 68 BPM | TEMPERATURE: 98 F | OXYGEN SATURATION: 99 % | WEIGHT: 135 LBS | HEIGHT: 62 IN | BODY MASS INDEX: 24.84 KG/M2 | DIASTOLIC BLOOD PRESSURE: 66 MMHG

## 2019-07-17 DIAGNOSIS — N30.90 CYSTITIS: Primary | ICD-10-CM

## 2019-07-17 LAB
B-HCG UR QL: NEGATIVE
BILIRUB UR QL STRIP: NEGATIVE
CLARITY UR: CLEAR
COLOR UR: YELLOW
GLUCOSE UR STRIP-MCNC: NEGATIVE MG/DL
KETONES UR STRIP-MCNC: NEGATIVE MG/DL
NITRITE UR QL STRIP: NEGATIVE
PH UR STRIP: 6 [PH]
PROT UR STRIP-MCNC: NEGATIVE MG/DL
SP GR UR STRIP: >=1.03
UROBILINOGEN UR STRIP-ACNC: <2 MG/DL

## 2019-07-17 PROCEDURE — 81002 URINALYSIS NONAUTO W/O SCOPE: CPT

## 2019-07-17 PROCEDURE — 87088 URINE BACTERIA CULTURE: CPT | Performed by: NURSE PRACTITIONER

## 2019-07-17 PROCEDURE — 99204 OFFICE O/P NEW MOD 45 MIN: CPT

## 2019-07-17 PROCEDURE — 87186 SC STD MICRODIL/AGAR DIL: CPT | Performed by: NURSE PRACTITIONER

## 2019-07-17 PROCEDURE — 99214 OFFICE O/P EST MOD 30 MIN: CPT

## 2019-07-17 PROCEDURE — 81025 URINE PREGNANCY TEST: CPT

## 2019-07-17 PROCEDURE — 87086 URINE CULTURE/COLONY COUNT: CPT | Performed by: NURSE PRACTITIONER

## 2019-07-17 RX ORDER — CEPHALEXIN 500 MG/1
500 CAPSULE ORAL 2 TIMES DAILY
Qty: 14 CAPSULE | Refills: 0 | Status: SHIPPED | OUTPATIENT
Start: 2019-07-17 | End: 2019-07-24

## 2019-07-17 NOTE — ED PROVIDER NOTES
Patient presents with:  Urinary Symptoms (urologic)      HPI:     Denise Mcgill is a 28year old female who presents with chief complaint of dysuria, urgency and frequency which started on Saturday evening. Also reports low back pain. No flank pain.   [place] Culture, Routine Once      POCT Urine Dip      POCT Pregnancy, Urine      cephALEXin 500 MG Oral Cap          Sig: Take 1 capsule (500 mg total) by mouth 2 (two) times daily for 7 days.           Dispense:  14 capsule          Refill:  0      Labs performed

## 2019-07-23 ENCOUNTER — OFFICE VISIT (OUTPATIENT)
Dept: FAMILY MEDICINE CLINIC | Facility: CLINIC | Age: 34
End: 2019-07-23
Payer: COMMERCIAL

## 2019-07-23 VITALS
BODY MASS INDEX: 22.45 KG/M2 | RESPIRATION RATE: 15 BRPM | DIASTOLIC BLOOD PRESSURE: 72 MMHG | HEART RATE: 92 BPM | HEIGHT: 63 IN | WEIGHT: 126.69 LBS | SYSTOLIC BLOOD PRESSURE: 106 MMHG | TEMPERATURE: 98 F

## 2019-07-23 DIAGNOSIS — Z30.011 ORAL CONTRACEPTION INITIAL PRESCRIPTION: Primary | ICD-10-CM

## 2019-07-23 DIAGNOSIS — Z30.011 ENCOUNTER FOR INITIAL PRESCRIPTION OF CONTRACEPTIVE PILLS: ICD-10-CM

## 2019-07-23 LAB
KIT LOT #: NORMAL NUMERIC
PREGNANCY TEST, URINE: NEGATIVE

## 2019-07-23 PROCEDURE — 81025 URINE PREGNANCY TEST: CPT | Performed by: PHYSICIAN ASSISTANT

## 2019-07-23 PROCEDURE — 99202 OFFICE O/P NEW SF 15 MIN: CPT | Performed by: PHYSICIAN ASSISTANT

## 2019-07-23 RX ORDER — NORGESTIMATE AND ETHINYL ESTRADIOL 7DAYSX3 28
1 KIT ORAL DAILY
Qty: 1 PACKAGE | Refills: 11 | Status: SHIPPED | OUTPATIENT
Start: 2019-07-23 | End: 2019-08-20 | Stop reason: WASHOUT

## 2019-07-23 NOTE — ASSESSMENT & PLAN NOTE
Start Ortho Tri Cyclen : 1 tablet PO QD. Advise patient to take it starting this Sunday. Patient verbalized understanding.

## 2019-07-23 NOTE — PROGRESS NOTES
HPI:     HPI  28year-old female is here in the office requesting BCP. Patient had IUD removal 1 and 1/2 years ago. Patient states that she used to take BCP before with no side effects. Her LMP was yesterday.   Patient denies of chest pain, SOB, N/V/C/D, fe Grandfather        Social History:   Social History    Socioeconomic History      Marital status:       Spouse name: Not on file      Number of children: Not on file      Years of education: Not on file      Highest education level: Not on file    O file      Review of Systems:   Review of Systems   Constitutional: Negative. Negative for activity change, chills, fatigue and fever. HENT: Negative. Respiratory: Negative. Negative for cough, chest tightness, shortness of breath and wheezing.     Ca TEST (Completed)          Discussed plan of care with pt and pt is in agreement. All questions answered. Pt to call with questions or concerns. Encouraged to sign up for My Chart if not already registered.

## 2019-12-24 ENCOUNTER — OFFICE VISIT (OUTPATIENT)
Dept: FAMILY MEDICINE CLINIC | Facility: CLINIC | Age: 34
End: 2019-12-24
Payer: COMMERCIAL

## 2019-12-24 VITALS
BODY MASS INDEX: 23.92 KG/M2 | SYSTOLIC BLOOD PRESSURE: 110 MMHG | RESPIRATION RATE: 18 BRPM | DIASTOLIC BLOOD PRESSURE: 73 MMHG | TEMPERATURE: 98 F | HEIGHT: 63 IN | WEIGHT: 135 LBS | HEART RATE: 59 BPM

## 2019-12-24 DIAGNOSIS — R35.89 POLYURIA: ICD-10-CM

## 2019-12-24 DIAGNOSIS — N30.00 ACUTE CYSTITIS WITHOUT HEMATURIA: Primary | ICD-10-CM

## 2019-12-24 PROCEDURE — 81003 URINALYSIS AUTO W/O SCOPE: CPT | Performed by: PHYSICIAN ASSISTANT

## 2019-12-24 PROCEDURE — 99213 OFFICE O/P EST LOW 20 MIN: CPT | Performed by: PHYSICIAN ASSISTANT

## 2019-12-24 RX ORDER — SULFAMETHOXAZOLE AND TRIMETHOPRIM 800; 160 MG/1; MG/1
1 TABLET ORAL 2 TIMES DAILY
Qty: 14 TABLET | Refills: 0 | Status: SHIPPED | OUTPATIENT
Start: 2019-12-24 | End: 2019-12-26 | Stop reason: ALTCHOICE

## 2019-12-24 NOTE — ASSESSMENT & PLAN NOTE
Start Bactrim DS PO BID for 7 days. · Complete full course of antibiotics. Will send the urine for culture if needed and we will call with results. · Over the counter Tylenol/Motrin as needed for pain/discomfort.   · Follow up in 3-4 days if symptoms do

## 2019-12-24 NOTE — PROGRESS NOTES
HPI:     Dysuria    This is a new problem. The current episode started yesterday. The problem occurs every urination. The problem has been unchanged. The quality of the pain is described as burning. There has been no fever.  There is no history of pyeloneph Social History    Socioeconomic History      Marital status:       Spouse name: Not on file      Number of children: Not on file      Years of education: Not on file      Highest education level: Not on file    Occupational History      Not on alvin Constitutional: Negative for activity change, chills, fatigue and fever. HENT: Negative for congestion, ear discharge, ear pain, postnasal drip, rhinorrhea, sinus pressure and sore throat.     Respiratory: Negative for cough, chest tightness, shortness affect. Assessment and Plan[de-identified]     Problem List Items Addressed This Visit        Genitourinary    Acute cystitis without hematuria - Primary     Start Bactrim DS PO BID for 7 days. · Complete full course of antibiotics.   Will send the urine for cultur

## 2019-12-26 ENCOUNTER — TELEPHONE (OUTPATIENT)
Dept: INTERNAL MEDICINE CLINIC | Facility: CLINIC | Age: 34
End: 2019-12-26

## 2019-12-26 ENCOUNTER — TELEPHONE (OUTPATIENT)
Dept: FAMILY MEDICINE CLINIC | Facility: CLINIC | Age: 34
End: 2019-12-26

## 2019-12-26 NOTE — TELEPHONE ENCOUNTER
Attempted to contact the pt regarding her test results but unable to leave voicemail due to not st up. It pt return call please send to [phone] today until 3:30 and ask to speak to Tyshawn Delgado. Thank you.

## 2019-12-26 NOTE — TELEPHONE ENCOUNTER
----- Message from Leeanna Butterfield PA-C sent at 12/26/2019  9:36 AM CST -----  Urine culture showed UTI. Please discontinue Bactrim DS. Will switch to Macrobid 100 mg PO BID for 7 days. Rx sent to pharmacy.

## 2019-12-28 NOTE — TELEPHONE ENCOUNTER
Pt calling back and advised of PA message below. Pt verb understanding and agrees to plan. Vaccine Information Statement(s) was given today. This has been reviewed, questions answered, and verbal consent given by Patient for injection(s) and administration of Influenza (Inactivated).    1. Does the patient have a moderate to severe fever?  No  2. Has the patient had a serious reaction to a flu shot before?   No  3. Has the patient ever had Guillian Natchez Syndrome within 6 weeks of a previous flu shot?  No  4. Is the patient less than 6 months of age?  No    Patient is eligible to receive the vaccine based on all questions being answered as 'No'.    Patient tolerated without incident. See immunization grid for documentation.

## 2020-01-25 ENCOUNTER — HOSPITAL ENCOUNTER (OUTPATIENT)
Age: 35
Discharge: HOME | End: 2020-01-25
Attending: EMERGENCY MEDICINE
Payer: OTHER GOVERNMENT

## 2020-01-25 VITALS
RESPIRATION RATE: 21 BRPM | DIASTOLIC BLOOD PRESSURE: 64 MMHG | HEART RATE: 66 BPM | OXYGEN SATURATION: 97 % | HEIGHT: 63 IN | TEMPERATURE: 98 F | BODY MASS INDEX: 23.04 KG/M2 | SYSTOLIC BLOOD PRESSURE: 98 MMHG | WEIGHT: 130 LBS

## 2020-01-25 DIAGNOSIS — N92.1 BREAKTHROUGH BLEEDING ON BIRTH CONTROL PILLS: Primary | ICD-10-CM

## 2020-01-25 LAB — B-HCG UR QL: NEGATIVE

## 2020-01-25 PROCEDURE — 87205 SMEAR GRAM STAIN: CPT | Performed by: EMERGENCY MEDICINE

## 2020-01-25 PROCEDURE — 81025 URINE PREGNANCY TEST: CPT

## 2020-01-25 PROCEDURE — 87106 FUNGI IDENTIFICATION YEAST: CPT | Performed by: EMERGENCY MEDICINE

## 2020-01-25 PROCEDURE — 87591 N.GONORRHOEAE DNA AMP PROB: CPT | Performed by: EMERGENCY MEDICINE

## 2020-01-25 PROCEDURE — 99214 OFFICE O/P EST MOD 30 MIN: CPT

## 2020-01-25 PROCEDURE — 87491 CHLMYD TRACH DNA AMP PROBE: CPT | Performed by: EMERGENCY MEDICINE

## 2020-01-25 PROCEDURE — 87808 TRICHOMONAS ASSAY W/OPTIC: CPT | Performed by: EMERGENCY MEDICINE

## 2020-01-25 RX ORDER — ONDANSETRON 4 MG/1
4 TABLET, ORALLY DISINTEGRATING ORAL EVERY 4 HOURS PRN
Qty: 10 TABLET | Refills: 0 | Status: SHIPPED | OUTPATIENT
Start: 2020-01-25 | End: 2020-02-01

## 2020-01-25 NOTE — ED PROVIDER NOTES
Patient Seen in: [address]      History   Patient presents with:  Shauna    Stated Complaint: vag bleeding/poss std    HPI    Patient is a 78-year-old female with history of HPV, had postcoital bleeding yesterday with pe nontender, nondistended, no hepatosplenomegaly  Pelvic:  External: No lesions no discharge  Speculum exam: Minimal discharge noted, white  Bimanual exam: Adnexa nontender, no cervical motion tenderness  Extremities: No calf tenderness or edema  Skin: Good

## 2020-01-25 NOTE — ED INITIAL ASSESSMENT (HPI)
Vaginal bleeding post intercourse yesterday, with pelvic pain, googled her symptoms and is concerned about chlamydia, denies unusual vaginal discharge, denies urinary symptoms

## 2020-01-26 LAB
C TRACH DNA SPEC QL NAA+PROBE: NEGATIVE
N GONORRHOEA DNA SPEC QL NAA+PROBE: NEGATIVE

## 2020-01-27 LAB
GENITAL VAGINOSIS SCREEN: NEGATIVE
TRICHOMONAS SCREEN: NEGATIVE

## 2020-01-27 NOTE — ED NOTES
Patient called on updated on labs from [facility]- STD testing. Patient stated she is continuing to have heavy vaginal bleeding. Patient states she has been dizzy and pale. Advised patient to go to ED. She refused to go now due to work.  Discussed reasons she n

## 2020-02-26 ENCOUNTER — HOSPITAL ENCOUNTER (OUTPATIENT)
Age: 35
Discharge: HOME | End: 2020-02-26
Attending: EMERGENCY MEDICINE
Payer: OTHER GOVERNMENT

## 2020-02-26 VITALS
HEART RATE: 66 BPM | HEIGHT: 62 IN | SYSTOLIC BLOOD PRESSURE: 101 MMHG | DIASTOLIC BLOOD PRESSURE: 69 MMHG | TEMPERATURE: 98 F | OXYGEN SATURATION: 99 % | WEIGHT: 135 LBS | BODY MASS INDEX: 24.84 KG/M2 | RESPIRATION RATE: 16 BRPM

## 2020-02-26 DIAGNOSIS — J02.0 STREP PHARYNGITIS: Primary | ICD-10-CM

## 2020-02-26 LAB — S PYO AG THROAT QL: POSITIVE

## 2020-02-26 PROCEDURE — 87430 STREP A AG IA: CPT

## 2020-02-26 PROCEDURE — 99214 OFFICE O/P EST MOD 30 MIN: CPT

## 2020-02-26 PROCEDURE — 99213 OFFICE O/P EST LOW 20 MIN: CPT

## 2020-02-26 RX ORDER — AMOXICILLIN 875 MG/1
875 TABLET, COATED ORAL 2 TIMES DAILY
Qty: 20 TABLET | Refills: 0 | Status: SHIPPED | OUTPATIENT
Start: 2020-02-26 | End: 2020-03-07

## 2020-02-26 RX ORDER — NORGESTIMATE AND ETHINYL ESTRADIOL 7DAYSX3 28
KIT ORAL
COMMUNITY
Start: 2020-02-10 | End: 2020-08-25

## 2020-02-26 NOTE — ED PROVIDER NOTES
Patient Seen in: [address] [facility]      History   Patient presents with:  Sore Throat    Stated Complaint: Sore throat    HPI    Patient complains of sore throat which started today.   Patient stated she has had a cough which is STREP - Abnormal; Notable for the following components:       Result Value    POCT Rapid Strep Positive (*)     All other components within normal limits               MDM     Did not think further testing was indicated.   Will place on amoxicillin

## 2020-03-01 ENCOUNTER — APPOINTMENT (OUTPATIENT)
Dept: GENERAL RADIOLOGY | Age: 35
End: 2020-03-01
Attending: EMERGENCY MEDICINE
Payer: OTHER GOVERNMENT

## 2020-03-01 ENCOUNTER — HOSPITAL ENCOUNTER (OUTPATIENT)
Age: 35
Discharge: HOME | End: 2020-03-01
Attending: EMERGENCY MEDICINE
Payer: OTHER GOVERNMENT

## 2020-03-01 VITALS
BODY MASS INDEX: 24.84 KG/M2 | DIASTOLIC BLOOD PRESSURE: 84 MMHG | WEIGHT: 135 LBS | OXYGEN SATURATION: 97 % | RESPIRATION RATE: 20 BRPM | HEIGHT: 62 IN | HEART RATE: 77 BPM | TEMPERATURE: 98 F | SYSTOLIC BLOOD PRESSURE: 120 MMHG

## 2020-03-01 DIAGNOSIS — S60.222A CONTUSION OF LEFT HAND, INITIAL ENCOUNTER: Primary | ICD-10-CM

## 2020-03-01 PROCEDURE — 99213 OFFICE O/P EST LOW 20 MIN: CPT

## 2020-03-01 PROCEDURE — 73130 X-RAY EXAM OF HAND: CPT | Performed by: EMERGENCY MEDICINE

## 2020-03-01 NOTE — ED PROVIDER NOTES
Patient Seen in: [address]      History   Patient presents with:  Upper Extremity Injury    Stated Complaint: finger pain    HPI    Patient is a 39-year-old female without contributory past medical history presents with third metacarpal  Nontender palmar aspect of the hand  Digits nontender    ED Course   Labs Reviewed - No data to display       No evidence of fracture        MDM     We will treat locally with the patient follow-up with PMD in 1 week repeat evaluation.
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
## 2020-03-28 ENCOUNTER — TELEPHONE (OUTPATIENT)
Dept: FAMILY MEDICINE CLINIC | Facility: CLINIC | Age: 35
End: 2020-03-28

## 2020-03-30 NOTE — TELEPHONE ENCOUNTER
Paging    Message # [phone] [phone] 10:57a [[email or web address]]  To:  From: ZAIDA Becerra MD:  Phone#:  ----------------------------------------------------------------------  PT [address] [phone],  84, PT HS CONCERNS OF STREP    Paged at number :

## 2020-04-10 ENCOUNTER — TELEPHONE (OUTPATIENT)
Dept: FAMILY MEDICINE CLINIC | Facility: CLINIC | Age: 35
End: 2020-04-10

## 2020-04-10 ENCOUNTER — PATIENT MESSAGE (OUTPATIENT)
Dept: FAMILY MEDICINE CLINIC | Facility: CLINIC | Age: 35
End: 2020-04-10

## 2020-04-10 DIAGNOSIS — J03.90 ACUTE TONSILLITIS, UNSPECIFIED ETIOLOGY: Primary | ICD-10-CM

## 2020-04-10 DIAGNOSIS — B37.3 CANDIDIASIS OF VAGINA: ICD-10-CM

## 2020-04-10 PROCEDURE — 99441 PHONE E/M BY PHYS 5-10 MIN: CPT | Performed by: PHYSICIAN ASSISTANT

## 2020-04-10 RX ORDER — METHYLPREDNISOLONE 4 MG/1
TABLET ORAL
Qty: 1 KIT | Refills: 0 | Status: SHIPPED | OUTPATIENT
Start: 2020-04-10 | End: 2020-05-01

## 2020-04-10 RX ORDER — AMOXICILLIN AND CLAVULANATE POTASSIUM 875; 125 MG/1; MG/1
1 TABLET, FILM COATED ORAL 2 TIMES DAILY
Qty: 20 TABLET | Refills: 0 | Status: SHIPPED | OUTPATIENT
Start: 2020-04-10 | End: 2020-04-20

## 2020-04-10 RX ORDER — FLUCONAZOLE 150 MG/1
TABLET ORAL
Qty: 2 TABLET | Refills: 0 | Status: SHIPPED | OUTPATIENT
Start: 2020-04-10 | End: 2020-05-01

## 2020-04-10 NOTE — TELEPHONE ENCOUNTER
Virtual Telephone Check-In    Cassius Chávez verbally consents to a Virtual/Telephone Check-In visit on 04/10/20. Patient understands and accepts financial responsibility for any deductible, co-insurance and/or co-pays associated with this service.

## 2020-04-10 NOTE — TELEPHONE ENCOUNTER
----- Message from Maame Metzger sent at 4/10/2020 12:43 PM CDT -----  Regarding: Prescription Question  Contact: [phone]  hi I had a few questions. .... 12 days ago I was prescribed antibiotics but now have a yeast infection and was wondering if I c

## 2020-04-10 NOTE — TELEPHONE ENCOUNTER
Patient reports contacted the on-call provider 3/28 was given script for strep throat, reports symptoms had improved but returned today.  Had fever this morning temp 100, has pain with swallow, feels lymph nodes swollen and tender, has white spots in the ba

## 2020-04-10 NOTE — TELEPHONE ENCOUNTER
From: Lena Landaverde  To: Carmen Beach PA-C  Sent: 4/10/2020 12:43 PM CDT  Subject: Prescription Question    hi I had a few questions. .... 12 days ago I was prescribed antibiotics but now have a yeast infection and was wondering if I could have a pres

## 2020-04-17 RX ORDER — FLUCONAZOLE 150 MG/1
TABLET ORAL
Qty: 2 TABLET | Refills: 0 | OUTPATIENT
Start: 2020-04-17

## 2020-04-30 ENCOUNTER — TELEPHONE (OUTPATIENT)
Dept: FAMILY MEDICINE CLINIC | Facility: CLINIC | Age: 35
End: 2020-04-30

## 2020-04-30 NOTE — TELEPHONE ENCOUNTER
Calling with update had augmentin 4/10/20 finished and was feeling ok. Started with sore throat, looks red and swollen again, with little white dots on back of throat and tonsils. 4/29/20  No fever.  No cough, body aches, and fatigued   Thinks she has strep

## 2020-04-30 NOTE — TELEPHONE ENCOUNTER
States she cannot come in to office has no one to watch her children. Please advise if Tele visit would be ok.

## 2020-05-01 ENCOUNTER — OFFICE VISIT (OUTPATIENT)
Dept: FAMILY MEDICINE CLINIC | Facility: CLINIC | Age: 35
End: 2020-05-01
Payer: OTHER GOVERNMENT

## 2020-05-01 VITALS
BODY MASS INDEX: 25 KG/M2 | DIASTOLIC BLOOD PRESSURE: 65 MMHG | RESPIRATION RATE: 18 BRPM | WEIGHT: 137 LBS | TEMPERATURE: 98 F | SYSTOLIC BLOOD PRESSURE: 109 MMHG | HEART RATE: 77 BPM

## 2020-05-01 DIAGNOSIS — J02.0 STREPTOCOCCAL PHARYNGITIS: Primary | ICD-10-CM

## 2020-05-01 DIAGNOSIS — J02.9 SORETHROAT: ICD-10-CM

## 2020-05-01 PROCEDURE — G0463 HOSPITAL OUTPT CLINIC VISIT: HCPCS | Performed by: PHYSICIAN ASSISTANT

## 2020-05-01 PROCEDURE — 87880 STREP A ASSAY W/OPTIC: CPT | Performed by: PHYSICIAN ASSISTANT

## 2020-05-01 PROCEDURE — 99213 OFFICE O/P EST LOW 20 MIN: CPT | Performed by: PHYSICIAN ASSISTANT

## 2020-05-01 RX ORDER — AMOXICILLIN AND CLAVULANATE POTASSIUM 875; 125 MG/1; MG/1
1 TABLET, FILM COATED ORAL 2 TIMES DAILY
Qty: 20 TABLET | Refills: 0 | Status: SHIPPED | OUTPATIENT
Start: 2020-05-01 | End: 2020-05-11

## 2020-05-01 NOTE — PROGRESS NOTES
HPI:     Sore Throat    This is a recurrent problem. The current episode started yesterday. The problem has been unchanged. There has been no fever.  Pertinent negatives include no abdominal pain, congestion, coughing, diarrhea, ear discharge, ear pain, hea Marital status:       Spouse name: Not on file      Number of children: Not on file      Years of education: Not on file      Highest education level: Not on file    Occupational History      Not on file    Social Needs      Financial resource strai chills, fatigue and fever. HENT: Positive for sore throat. Negative for congestion, ear discharge, ear pain, hoarse voice, postnasal drip, rhinorrhea and sinus pressure.     Respiratory: Negative for cough, chest tightness, shortness of breath, wheezing a tonsil. Assessment and Plan[de-identified]     Problem List Items Addressed This Visit        Infectious/Inflammatory    Streptococcal pharyngitis - Primary     Start Augmentin 875 mg PO BID for 10 days.  Supportive care includes:    • encourage fluid intake   • Advi

## 2020-05-02 PROBLEM — J02.0 STREPTOCOCCAL PHARYNGITIS: Status: ACTIVE | Noted: 2020-05-02

## 2020-05-02 NOTE — ASSESSMENT & PLAN NOTE
Start Augmentin 875 mg PO BID for 10 days. Supportive care includes:    • encourage fluid intake   • Advise patient to take Tylenol/Ibuprofen as needed for pain. • warm salt water gargles   • Dispose toothbrush after 3 days uses.

## 2020-05-03 ENCOUNTER — MOBILE ENCOUNTER (OUTPATIENT)
Dept: FAMILY MEDICINE CLINIC | Facility: CLINIC | Age: 35
End: 2020-05-03

## 2020-05-03 RX ORDER — FLUCONAZOLE 200 MG/1
200 TABLET ORAL
Qty: 2 TABLET | Refills: 0 | Status: SHIPPED | OUTPATIENT
Start: 2020-05-03 | End: 2020-09-09 | Stop reason: ALTCHOICE

## 2020-05-14 ENCOUNTER — TELEPHONE (OUTPATIENT)
Dept: FAMILY MEDICINE CLINIC | Facility: CLINIC | Age: 35
End: 2020-05-14

## 2020-05-14 DIAGNOSIS — J03.01 RECURRENT STREPTOCOCCAL TONSILLITIS: Primary | ICD-10-CM

## 2020-05-14 RX ORDER — CLINDAMYCIN HYDROCHLORIDE 300 MG/1
300 CAPSULE ORAL 3 TIMES DAILY
Qty: 30 CAPSULE | Refills: 0 | Status: SHIPPED | OUTPATIENT
Start: 2020-05-14 | End: 2020-05-24

## 2020-05-14 RX ORDER — FLUCONAZOLE 150 MG/1
TABLET ORAL
Qty: 2 TABLET | Refills: 0 | Status: SHIPPED | OUTPATIENT
Start: 2020-05-14 | End: 2020-09-09 | Stop reason: ALTCHOICE

## 2020-05-14 NOTE — TELEPHONE ENCOUNTER
Called and discussed with patient regarding her symptoms. Patient states that she finished Augmentin 875 mg on 05/10, sore throat came back after she stopped taking antibiotic. Patient states that the symptoms are the same when she was at [address]    Plan: Clind

## 2020-05-14 NOTE — TELEPHONE ENCOUNTER
Pt calling to report she still has sore throat, \"and now my tonsils are swollen and there are white spots on them\"    Pt states she had tested positive for strep, and has completed three rounds of antibiotics. Pt seeking recommendations.     Please advis

## 2020-05-19 ENCOUNTER — NURSE TRIAGE (OUTPATIENT)
Dept: FAMILY MEDICINE CLINIC | Facility: CLINIC | Age: 35
End: 2020-05-19

## 2020-05-19 ENCOUNTER — VIRTUAL PHONE E/M (OUTPATIENT)
Dept: FAMILY MEDICINE CLINIC | Facility: CLINIC | Age: 35
End: 2020-05-19
Payer: OTHER GOVERNMENT

## 2020-05-19 DIAGNOSIS — J39.9 UPPER RESPIRATORY DISEASE: Primary | ICD-10-CM

## 2020-05-19 PROCEDURE — 99441 PHONE E/M BY PHYS 5-10 MIN: CPT | Performed by: PHYSICIAN ASSISTANT

## 2020-05-19 NOTE — TELEPHONE ENCOUNTER
Patient was diagnosed with strep on 5/1/2020 and on clindamycin. Patient had a sore throat, fevers, swollen tonsils, dry cough, and body aches. No longer having fevers. Patient on day 4 of antibiotic. Still has a dry cough. No other symptoms.   Patient fou

## 2020-05-19 NOTE — PROGRESS NOTES
HPI: HPI    I spoke Ivone Valerio by telephone , verified date of birth, and discussed current concerns.   Onset/duration of current symptoms: 3 days    Common COVID-19 symptoms per CDC: CDC Clinical Guidance  • Fever:     Yes []     No [x]       • Co Family History   Problem Relation Age of Onset   • Musculo-skelatal Disorder Mother         multiple sclerosis   • Stroke Mother    • Lipids Mother    • Hypertension Maternal Grandmother    • Skin cancer Maternal Grandmother         basal cell   • Lipi Caffeine Concern: Yes          coffee, soda 1 cup        Occupational Exposure: Not Asked        Hobby Hazards: Not Asked        Sleep Concern: Not Asked        Stress Concern: Not Asked        Weight Concern: Not Asked        Special Diet: Not Asked appropriate physical exam nor face-to-face examination, which may limit and/or reduce diagnostic accuracy. Otf Real was informed and agrees that this telemedicine visit will charged.  Otf Real expresses understanding, accepts these limitations

## 2020-05-20 ENCOUNTER — LAB ENCOUNTER (OUTPATIENT)
Dept: LAB | Facility: HOSPITAL | Age: 35
End: 2020-05-20
Attending: PHYSICIAN ASSISTANT
Payer: OTHER GOVERNMENT

## 2020-05-20 DIAGNOSIS — J39.9 UPPER RESPIRATORY DISEASE: ICD-10-CM

## 2020-06-27 ENCOUNTER — PATIENT MESSAGE (OUTPATIENT)
Dept: FAMILY MEDICINE CLINIC | Facility: CLINIC | Age: 35
End: 2020-06-27

## 2020-06-28 ENCOUNTER — TELEPHONE (OUTPATIENT)
Dept: FAMILY MEDICINE CLINIC | Facility: CLINIC | Age: 35
End: 2020-06-28

## 2020-08-25 RX ORDER — NORGESTIMATE AND ETHINYL ESTRADIOL 7DAYSX3 28
KIT ORAL
Qty: 28 TABLET | Refills: 1 | Status: SHIPPED | OUTPATIENT
Start: 2020-08-25 | End: 2020-09-09

## 2020-08-25 NOTE — TELEPHONE ENCOUNTER
The patient is calling and asking for a refill and if she can have additional refills. She has 3 children starting school and e learning. She will call back to schedule an appointment in about a month when her mother can help.

## 2020-09-09 ENCOUNTER — OFFICE VISIT (OUTPATIENT)
Dept: FAMILY MEDICINE CLINIC | Facility: CLINIC | Age: 35
End: 2020-09-09
Payer: OTHER GOVERNMENT

## 2020-09-09 VITALS
SYSTOLIC BLOOD PRESSURE: 114 MMHG | TEMPERATURE: 97 F | WEIGHT: 139.5 LBS | BODY MASS INDEX: 25.67 KG/M2 | DIASTOLIC BLOOD PRESSURE: 66 MMHG | HEART RATE: 62 BPM | HEIGHT: 62 IN

## 2020-09-09 DIAGNOSIS — Z01.419 ENCOUNTER FOR ROUTINE GYNECOLOGICAL EXAMINATION WITH PAPANICOLAOU SMEAR OF CERVIX: ICD-10-CM

## 2020-09-09 DIAGNOSIS — Z00.00 ROUTINE GENERAL MEDICAL EXAMINATION AT A HEALTH CARE FACILITY: Primary | ICD-10-CM

## 2020-09-09 DIAGNOSIS — Z00.00 WELLNESS EXAMINATION: ICD-10-CM

## 2020-09-09 PROBLEM — Z34.90 PREGNANCY (HCC): Status: RESOLVED | Noted: 2017-04-03 | Resolved: 2020-09-09

## 2020-09-09 PROBLEM — Z34.93 ENCOUNTER FOR SUPERVISION OF NORMAL PREGNANCY IN THIRD TRIMESTER (HCC): Status: RESOLVED | Noted: 2017-01-17 | Resolved: 2020-09-09

## 2020-09-09 PROBLEM — Z34.90 PREGNANCY: Status: RESOLVED | Noted: 2017-04-03 | Resolved: 2020-09-09

## 2020-09-09 PROBLEM — Z34.93 ENCOUNTER FOR SUPERVISION OF NORMAL PREGNANCY IN THIRD TRIMESTER: Status: RESOLVED | Noted: 2017-01-17 | Resolved: 2020-09-09

## 2020-09-09 PROBLEM — IMO0002 ABNORMAL FETAL HEART RATE: Status: RESOLVED | Noted: 2017-03-09 | Resolved: 2020-09-09

## 2020-09-09 PROCEDURE — 3074F SYST BP LT 130 MM HG: CPT | Performed by: PHYSICIAN ASSISTANT

## 2020-09-09 PROCEDURE — G0463 HOSPITAL OUTPT CLINIC VISIT: HCPCS | Performed by: PHYSICIAN ASSISTANT

## 2020-09-09 PROCEDURE — 99395 PREV VISIT EST AGE 18-39: CPT | Performed by: PHYSICIAN ASSISTANT

## 2020-09-09 PROCEDURE — 3078F DIAST BP <80 MM HG: CPT | Performed by: PHYSICIAN ASSISTANT

## 2020-09-09 PROCEDURE — 3008F BODY MASS INDEX DOCD: CPT | Performed by: PHYSICIAN ASSISTANT

## 2020-09-09 RX ORDER — NORGESTIMATE AND ETHINYL ESTRADIOL 7DAYSX3 28
1 KIT ORAL DAILY
Qty: 28 TABLET | Refills: 11 | Status: SHIPPED | OUTPATIENT
Start: 2020-09-09 | End: 2020-11-16

## 2020-09-09 NOTE — PROGRESS NOTES
HPI:   Ivone Valerio is a 27year old female who presents for an Annual Health Visit. Patient is feeling at this time. Patient denies of chest pain, SOB, N/V/C/D, fever, dizziness, syncope, abdominal pain. There are no other concerns today.     Heather Olivas Musculoskeletal: Negative. Skin: Negative. Neurological: Negative. Psychiatric/Behavioral: Negative.       EXAM:   /66   Pulse 62   Temp 97.4 °F (36.3 °C) (Oral)   Ht 5' 2\" (1.575 m)   Wt 139 lb 8 oz (63.3 kg)   LMP 08/18/2020 (Exact Date) adenopathy present. Neurological: She is alert and oriented to person, place, and time. She has normal reflexes. Skin: Skin is warm and dry. Psychiatric: She has a normal mood and affect.  Her behavior is normal. Judgment and thought content normal. diagnostic, but are used to determine if more testing is needed. Health counseling is essential, too. Below are guidelines for these, for women ages 25 to 44. Talk with your healthcare provider to make sure you’re up-to-date on what you need.    Screening W your 30s    Vaccine Who needs it How often   Chickenpox (varicella)  All women in this age group who have no record of this infection or vaccine  2 doses; the second dose should be given 4 to 8 weeks after the first dose    Hepatitis A Women at increased r susceptibility  Women with increased risk for having gene mutation  When your risk is known    Breast cancer and chemoprevention  Women at high risk for breast cancer  When your risk is known    Diet and exercise Women who are overweight or obese  When jen heart rate     40 weeks gestation of pregnancy     Pregnancy     Oral contraception initial prescription     Acute cystitis without hematuria     Streptococcal pharyngitis      Zeny Hoyt PA-C  9/9/2020  2:13 PM

## 2020-09-10 LAB
C TRACH DNA SPEC QL NAA+PROBE: NEGATIVE
N GONORRHOEA DNA SPEC QL NAA+PROBE: NEGATIVE

## 2020-09-11 LAB
GENITAL VAGINOSIS SCREEN: NEGATIVE
TRICHOMONAS SCREEN: NEGATIVE

## 2020-10-15 ENCOUNTER — TELEMEDICINE (OUTPATIENT)
Dept: FAMILY MEDICINE CLINIC | Facility: CLINIC | Age: 35
End: 2020-10-15
Payer: OTHER GOVERNMENT

## 2020-10-15 ENCOUNTER — TELEPHONE (OUTPATIENT)
Dept: FAMILY MEDICINE CLINIC | Facility: CLINIC | Age: 35
End: 2020-10-15

## 2020-10-15 DIAGNOSIS — Z20.822 CLOSE EXPOSURE TO 2019 NOVEL CORONAVIRUS: Primary | ICD-10-CM

## 2020-10-15 PROCEDURE — 99441 PHONE E/M BY PHYS 5-10 MIN: CPT | Performed by: PHYSICIAN ASSISTANT

## 2020-10-15 NOTE — TELEPHONE ENCOUNTER
Patient calling reports was exposed to [employer]  + person about 10 days ago   Needs a Negative test to return to work     Patient has no symptoms at the present time . Explained the [place] testing process and offered other options ot be tested.  Pt jama

## 2020-10-15 NOTE — PROGRESS NOTES
HPI: HPI    I spoke Fortunato Robles by telephone , verified date of birth, and discussed current concerns.       Common COVID-19 symptoms per CDC: CDC Clinical Guidance  • Fever:     Yes []     No [x]       • Cough     Yes []     No [x]     Dry []     P melanoma   • Diabetes Brother    • Psychiatric Maternal Uncle         mental illness, paranoid schizophrenia   • Colon Cancer Maternal Grandfather        Social History:   Social History    Socioeconomic History      Marital status:       Sp Bike Helmet: Not Asked        Seat Belt: Not Asked        Self-Exams: Not Asked    Social History Narrative      Not on file      Review of Systems:   Review of Systems   Constitutional: Negative for activity change, chills, fatigue and fever.    HENT: Ne

## 2020-10-16 ENCOUNTER — APPOINTMENT (OUTPATIENT)
Dept: LAB | Age: 35
End: 2020-10-16
Attending: PHYSICIAN ASSISTANT
Payer: OTHER GOVERNMENT

## 2020-10-16 DIAGNOSIS — Z20.822 EXPOSURE TO COVID-19 VIRUS: ICD-10-CM

## 2020-11-03 ENCOUNTER — VIRTUAL PHONE E/M (OUTPATIENT)
Dept: FAMILY MEDICINE CLINIC | Facility: CLINIC | Age: 35
End: 2020-11-03
Payer: OTHER GOVERNMENT

## 2020-11-03 DIAGNOSIS — Z20.822 CLOSE EXPOSURE TO 2019 NOVEL CORONAVIRUS: Primary | ICD-10-CM

## 2020-11-03 PROCEDURE — 99441 PHONE E/M BY PHYS 5-10 MIN: CPT | Performed by: PHYSICIAN ASSISTANT

## 2020-11-03 NOTE — PROGRESS NOTES
HPI: HPI    I spoke Buzz Lazier by telephone , verified date of birth, and discussed current concerns.   Onset/duration of current symptoms:  yesterday    Common COVID-19 symptoms per CDC: CDC Clinical Guidance  • Fever:     Yes []     No [x] Grandmother    • Skin cancer Brother         melanoma   • Diabetes Brother    • Psychiatric Maternal Uncle         mental illness, paranoid schizophrenia   • Colon Cancer Maternal Grandfather        Social History:   Social History    Socioeconomic History Asked        Exercise: Not Asked        Bike Helmet: Not Asked        Seat Belt: Not Asked        Self-Exams: Not Asked    Social History Narrative      Not on file      Review of Systems:   Review of Systems   Constitutional: Positive for chills and fatig

## 2020-11-04 ENCOUNTER — LAB ENCOUNTER (OUTPATIENT)
Dept: LAB | Age: 35
End: 2020-11-04
Attending: PHYSICIAN ASSISTANT
Payer: MEDICAID

## 2020-11-04 DIAGNOSIS — Z20.822 CLOSE EXPOSURE TO 2019 NOVEL CORONAVIRUS: ICD-10-CM

## 2020-11-04 DIAGNOSIS — Z01.419 ENCOUNTER FOR ROUTINE GYNECOLOGICAL EXAMINATION WITH PAPANICOLAOU SMEAR OF CERVIX: Primary | ICD-10-CM

## 2020-11-06 ENCOUNTER — TELEPHONE (OUTPATIENT)
Dept: FAMILY MEDICINE CLINIC | Facility: CLINIC | Age: 35
End: 2020-11-06

## 2020-11-06 NOTE — TELEPHONE ENCOUNTER
Pt state she received call and is calling back. Reviewed test results as noted below. Pt state she has been taking Motrin as she has arthritis and muscle aches from lifting weights.  Pt also states she is taking medicine for seasonal allergies, also over th

## 2020-11-16 RX ORDER — NORGESTIMATE AND ETHINYL ESTRADIOL 7DAYSX3 28
1 KIT ORAL DAILY
Qty: 28 TABLET | Refills: 11 | Status: SHIPPED | OUTPATIENT
Start: 2020-11-16 | End: 2021-07-27

## 2021-02-17 NOTE — PROGRESS NOTES
HPI: HPI  Patient states that she was diagnosed with ADD when she was teenager. Patient takes Adderall on and off. Patient is unable to focus recently, able to finish tasks sometimes. Patient stopped taking Adderall in 2011.   Patient denies of chest pa Socioeconomic History      Marital status:       Spouse name: Not on file      Number of children: Not on file      Years of education: Not on file      Highest education level: Not on file    Occupational History      Not on file    Social Needs for activity change, chills, fatigue and fever. HENT: Negative for congestion, ear discharge, ear pain, postnasal drip, rhinorrhea, sinus pressure and sore throat. Respiratory: Negative for cough, chest tightness, shortness of breath and wheezing.

## 2021-02-25 ENCOUNTER — TELEPHONE (OUTPATIENT)
Dept: FAMILY MEDICINE CLINIC | Facility: CLINIC | Age: 36
End: 2021-02-25

## 2021-02-25 NOTE — TELEPHONE ENCOUNTER
Patient started to take adderall on 2/17, usually around 7am. At around 1pm patient states that the effects of the medication start to wear off. Patient started taking a second tablet on 2/23 at around 1pm. Patient states she feels much better when doing this. Patient does not have any issues falling asleep at night. Patient would like to know if she should continue taking 2 tablets or if adderall with extended release would be a better option for her. Patient would also like to know if she is able to continue taking 2 tablets of Excedrin extra strength when she has migraines.

## 2021-03-09 DIAGNOSIS — F98.8 ADD (ATTENTION DEFICIT DISORDER) WITHOUT HYPERACTIVITY: ICD-10-CM

## 2021-03-09 RX ORDER — DEXTROAMPHETAMINE SACCHARATE, AMPHETAMINE ASPARTATE, DEXTROAMPHETAMINE SULFATE AND AMPHETAMINE SULFATE 1.25; 1.25; 1.25; 1.25 MG/1; MG/1; MG/1; MG/1
5 TABLET ORAL 2 TIMES DAILY
Qty: 60 TABLET | Refills: 0 | Status: SHIPPED | OUTPATIENT
Start: 2021-03-09 | End: 2021-04-08

## 2021-03-09 NOTE — TELEPHONE ENCOUNTER
Patient is requesting  Refill of medication amphetamine-dextroamphetamine (ADDERALL) 5 MG Oral Tab. Patient states she has 1 tablet left.

## 2021-03-11 ENCOUNTER — LAB ENCOUNTER (OUTPATIENT)
Dept: LAB | Age: 36
End: 2021-03-11
Attending: PHYSICIAN ASSISTANT
Payer: MEDICAID

## 2021-03-11 DIAGNOSIS — Z00.00 ROUTINE GENERAL MEDICAL EXAMINATION AT A HEALTH CARE FACILITY: ICD-10-CM

## 2021-03-11 LAB
ALT SERPL-CCNC: 18 U/L
ANION GAP SERPL CALC-SCNC: 3 MMOL/L (ref 0–18)
AST SERPL-CCNC: 14 U/L (ref 15–37)
BASOPHILS # BLD AUTO: 0.05 X10(3) UL (ref 0–0.2)
BASOPHILS NFR BLD AUTO: 0.7 %
BUN BLD-MCNC: 15 MG/DL (ref 7–18)
BUN/CREAT SERPL: 17.2 (ref 10–20)
CALCIUM BLD-MCNC: 9.5 MG/DL (ref 8.5–10.1)
CHLORIDE SERPL-SCNC: 107 MMOL/L (ref 98–112)
CHOLEST SMN-MCNC: 187 MG/DL (ref ?–200)
CO2 SERPL-SCNC: 29 MMOL/L (ref 21–32)
CREAT BLD-MCNC: 0.87 MG/DL
DEPRECATED RDW RBC AUTO: 40.8 FL (ref 35.1–46.3)
EOSINOPHIL # BLD AUTO: 0.11 X10(3) UL (ref 0–0.7)
EOSINOPHIL NFR BLD AUTO: 1.5 %
ERYTHROCYTE [DISTWIDTH] IN BLOOD BY AUTOMATED COUNT: 12.4 % (ref 11–15)
GLUCOSE BLD-MCNC: 91 MG/DL (ref 70–99)
HCT VFR BLD AUTO: 42.8 %
HDLC SERPL-MCNC: 55 MG/DL (ref 40–59)
HGB BLD-MCNC: 14.3 G/DL
IMM GRANULOCYTES # BLD AUTO: 0.01 X10(3) UL (ref 0–1)
IMM GRANULOCYTES NFR BLD: 0.1 %
LDLC SERPL CALC-MCNC: 107 MG/DL (ref ?–100)
LYMPHOCYTES # BLD AUTO: 2.8 X10(3) UL (ref 1–4)
LYMPHOCYTES NFR BLD AUTO: 37.4 %
MCH RBC QN AUTO: 29.6 PG (ref 26–34)
MCHC RBC AUTO-ENTMCNC: 33.4 G/DL (ref 31–37)
MCV RBC AUTO: 88.6 FL
MONOCYTES # BLD AUTO: 0.46 X10(3) UL (ref 0.1–1)
MONOCYTES NFR BLD AUTO: 6.1 %
NEUTROPHILS # BLD AUTO: 4.05 X10 (3) UL (ref 1.5–7.7)
NEUTROPHILS # BLD AUTO: 4.05 X10(3) UL (ref 1.5–7.7)
NEUTROPHILS NFR BLD AUTO: 54.2 %
NONHDLC SERPL-MCNC: 132 MG/DL (ref ?–130)
OSMOLALITY SERPL CALC.SUM OF ELEC: 288 MOSM/KG (ref 275–295)
PATIENT FASTING Y/N/NP: YES
PATIENT FASTING Y/N/NP: YES
PLATELET # BLD AUTO: 275 10(3)UL (ref 150–450)
POTASSIUM SERPL-SCNC: 4.1 MMOL/L (ref 3.5–5.1)
RBC # BLD AUTO: 4.83 X10(6)UL
SODIUM SERPL-SCNC: 139 MMOL/L (ref 136–145)
TRIGL SERPL-MCNC: 123 MG/DL (ref 30–149)
TSI SER-ACNC: 1.8 MIU/ML (ref 0.36–3.74)
VLDLC SERPL CALC-MCNC: 25 MG/DL (ref 0–30)
WBC # BLD AUTO: 7.5 X10(3) UL (ref 4–11)

## 2021-03-11 PROCEDURE — 82306 VITAMIN D 25 HYDROXY: CPT

## 2021-03-11 PROCEDURE — 80061 LIPID PANEL: CPT

## 2021-03-11 PROCEDURE — 84450 TRANSFERASE (AST) (SGOT): CPT

## 2021-03-11 PROCEDURE — 84460 ALANINE AMINO (ALT) (SGPT): CPT

## 2021-03-11 PROCEDURE — 80048 BASIC METABOLIC PNL TOTAL CA: CPT

## 2021-03-11 PROCEDURE — 85025 COMPLETE CBC W/AUTO DIFF WBC: CPT

## 2021-03-11 PROCEDURE — 36415 COLL VENOUS BLD VENIPUNCTURE: CPT

## 2021-03-11 PROCEDURE — 84443 ASSAY THYROID STIM HORMONE: CPT

## 2021-03-12 LAB — 25(OH)D3 SERPL-MCNC: 36.6 NG/ML (ref 30–100)

## 2021-04-14 ENCOUNTER — TELEPHONE (OUTPATIENT)
Dept: FAMILY MEDICINE CLINIC | Facility: CLINIC | Age: 36
End: 2021-04-14

## 2021-04-14 RX ORDER — DEXTROAMPHETAMINE SACCHARATE, AMPHETAMINE ASPARTATE, DEXTROAMPHETAMINE SULFATE AND AMPHETAMINE SULFATE 1.25; 1.25; 1.25; 1.25 MG/1; MG/1; MG/1; MG/1
5 TABLET ORAL DAILY
Qty: 30 TABLET | Refills: 0 | Status: SHIPPED | OUTPATIENT
Start: 2021-04-14 | End: 2021-04-16 | Stop reason: CLARIF

## 2021-04-14 RX ORDER — DEXTROAMPHETAMINE SACCHARATE, AMPHETAMINE ASPARTATE, DEXTROAMPHETAMINE SULFATE AND AMPHETAMINE SULFATE 1.25; 1.25; 1.25; 1.25 MG/1; MG/1; MG/1; MG/1
5 TABLET ORAL DAILY
Qty: 30 TABLET | Refills: 0 | Status: SHIPPED | OUTPATIENT
Start: 2021-06-15 | End: 2021-04-16 | Stop reason: CLARIF

## 2021-04-14 RX ORDER — DEXTROAMPHETAMINE SACCHARATE, AMPHETAMINE ASPARTATE, DEXTROAMPHETAMINE SULFATE AND AMPHETAMINE SULFATE 1.25; 1.25; 1.25; 1.25 MG/1; MG/1; MG/1; MG/1
5 TABLET ORAL DAILY
Qty: 30 TABLET | Refills: 0 | Status: SHIPPED | OUTPATIENT
Start: 2021-05-15 | End: 2021-04-16 | Stop reason: CLARIF

## 2021-04-16 RX ORDER — DEXTROAMPHETAMINE SACCHARATE, AMPHETAMINE ASPARTATE, DEXTROAMPHETAMINE SULFATE AND AMPHETAMINE SULFATE 1.25; 1.25; 1.25; 1.25 MG/1; MG/1; MG/1; MG/1
5 TABLET ORAL 2 TIMES DAILY
Qty: 60 TABLET | Refills: 0 | Status: SHIPPED | OUTPATIENT
Start: 2021-05-17 | End: 2021-06-16

## 2021-04-16 RX ORDER — DEXTROAMPHETAMINE SACCHARATE, AMPHETAMINE ASPARTATE, DEXTROAMPHETAMINE SULFATE AND AMPHETAMINE SULFATE 1.25; 1.25; 1.25; 1.25 MG/1; MG/1; MG/1; MG/1
5 TABLET ORAL 2 TIMES DAILY
Qty: 60 TABLET | Refills: 0 | Status: SHIPPED | OUTPATIENT
Start: 2021-04-16 | End: 2021-05-16

## 2021-04-16 RX ORDER — DEXTROAMPHETAMINE SACCHARATE, AMPHETAMINE ASPARTATE, DEXTROAMPHETAMINE SULFATE AND AMPHETAMINE SULFATE 1.25; 1.25; 1.25; 1.25 MG/1; MG/1; MG/1; MG/1
5 TABLET ORAL 2 TIMES DAILY
Qty: 60 TABLET | Refills: 0 | Status: SHIPPED | OUTPATIENT
Start: 2021-06-17 | End: 2021-07-17

## 2021-04-16 NOTE — TELEPHONE ENCOUNTER
Prior authorization for adderall has been initiated through [employer]: [id number] It takes about 1-5 business days for a decision to come back.

## 2021-04-16 NOTE — TELEPHONE ENCOUNTER
Spoke to pharmcy, Adderal 5 mg PO every day has been canceled, pharmacy has received new rx for Adderal 5 mg BID

## 2021-04-16 NOTE — TELEPHONE ENCOUNTER
Spoke with patient ( verified)--reports Adderall refill was sent, but it should be for BID and only daily Rx sent    Adderall 5 mg BID 90 day panel pended for approval--ordered in 2021     Patient also states PA for medication is needed d/t her in

## 2021-05-22 ENCOUNTER — HOSPITAL ENCOUNTER (OUTPATIENT)
Age: 36
Discharge: HOME | End: 2021-05-22
Payer: OTHER GOVERNMENT

## 2021-05-22 ENCOUNTER — NURSE TRIAGE (OUTPATIENT)
Dept: FAMILY MEDICINE CLINIC | Facility: CLINIC | Age: 36
End: 2021-05-22

## 2021-05-22 VITALS
TEMPERATURE: 98 F | OXYGEN SATURATION: 100 % | DIASTOLIC BLOOD PRESSURE: 69 MMHG | SYSTOLIC BLOOD PRESSURE: 110 MMHG | RESPIRATION RATE: 18 BRPM | HEART RATE: 62 BPM

## 2021-05-22 DIAGNOSIS — J30.9 ALLERGIC RHINITIS, UNSPECIFIED SEASONALITY, UNSPECIFIED TRIGGER: ICD-10-CM

## 2021-05-22 DIAGNOSIS — J02.9 SORE THROAT: Primary | ICD-10-CM

## 2021-05-22 PROCEDURE — 87880 STREP A ASSAY W/OPTIC: CPT

## 2021-05-22 PROCEDURE — 99213 OFFICE O/P EST LOW 20 MIN: CPT

## 2021-05-22 NOTE — ED PROVIDER NOTES
Patient Seen in: [facility]      History   Patient presents with:  Sore Throat    Stated Complaint: TL sore throat    HPI/Subjective:   HPI    37 yo female here for evaluation of sore throat onset yesterday evening.   Patient denies fever/chil Extraocular Movements: Extraocular movements intact. Pupils: Pupils are equal, round, and reactive to light. Cardiovascular:      Rate and Rhythm: Normal rate.    Pulmonary:      Effort: Pulmonary effort is normal.   Abdominal:      General: Abdomen

## 2021-05-22 NOTE — TELEPHONE ENCOUNTER
Action Requested: Summary for Provider     []  Critical Lab, Recommendations Needed  [] Need Additional Advice  []   FYI    []   Need Orders  [] Need Medications Sent to Pharmacy  []  Other     SUMMARY: Per protocol advised :  To be seen, advised to go to U

## 2021-06-09 ENCOUNTER — TELEPHONE (OUTPATIENT)
Dept: FAMILY MEDICINE CLINIC | Facility: CLINIC | Age: 36
End: 2021-06-09

## 2021-06-09 NOTE — TELEPHONE ENCOUNTER
Patient called asking for refill of Adderall. Patient was advised that 1 refill is still available at her pharmacy.          Medication Detail    Medication Quantity Refills Start End   amphetamine-dextroamphetamine (ADDERALL) 5 MG Oral Tab 60 tablet 0 6/17

## 2021-07-21 RX ORDER — DEXTROAMPHETAMINE SACCHARATE, AMPHETAMINE ASPARTATE, DEXTROAMPHETAMINE SULFATE AND AMPHETAMINE SULFATE 1.25; 1.25; 1.25; 1.25 MG/1; MG/1; MG/1; MG/1
5 TABLET ORAL 2 TIMES DAILY
Qty: 60 TABLET | Refills: 0 | Status: SHIPPED | OUTPATIENT
Start: 2021-08-21 | End: 2021-09-20

## 2021-07-21 RX ORDER — DEXTROAMPHETAMINE SACCHARATE, AMPHETAMINE ASPARTATE, DEXTROAMPHETAMINE SULFATE AND AMPHETAMINE SULFATE 1.25; 1.25; 1.25; 1.25 MG/1; MG/1; MG/1; MG/1
5 TABLET ORAL 2 TIMES DAILY
Qty: 60 TABLET | Refills: 0 | Status: SHIPPED | OUTPATIENT
Start: 2021-07-21 | End: 2021-08-20

## 2021-07-21 RX ORDER — DEXTROAMPHETAMINE SACCHARATE, AMPHETAMINE ASPARTATE, DEXTROAMPHETAMINE SULFATE AND AMPHETAMINE SULFATE 1.25; 1.25; 1.25; 1.25 MG/1; MG/1; MG/1; MG/1
5 TABLET ORAL 2 TIMES DAILY
Qty: 60 TABLET | Refills: 0 | Status: SHIPPED | OUTPATIENT
Start: 2021-09-21 | End: 2021-10-30

## 2021-07-27 RX ORDER — NORGESTIMATE AND ETHINYL ESTRADIOL 7DAYSX3 28
1 KIT ORAL DAILY
Qty: 28 TABLET | Refills: 3 | Status: SHIPPED | OUTPATIENT
Start: 2021-07-27 | End: 2022-01-05

## 2021-08-23 NOTE — TELEPHONE ENCOUNTER
Called for adderall refill. Advised she has a 30 day supply at the pharmacy as it was sent as a 90 day panel. Advised to call us with any problems.      amphetamine-dextroamphetamine (ADDERALL) 5 MG Oral Tab 60 tablet 0 9/21/2021 10/21/2021    Sig - Route:

## 2021-09-28 ENCOUNTER — TELEPHONE (OUTPATIENT)
Dept: FAMILY MEDICINE CLINIC | Facility: CLINIC | Age: 36
End: 2021-09-28

## 2021-09-28 DIAGNOSIS — R35.0 URINARY FREQUENCY: Primary | ICD-10-CM

## 2021-09-28 NOTE — TELEPHONE ENCOUNTER
Paged with c/o bladder symptoms. Low back and pelvic pain, no clear burning or urgency. Recommend UA due to lack of specific symptoms. She agrees.

## 2021-09-29 ENCOUNTER — HOSPITAL ENCOUNTER (OUTPATIENT)
Age: 36
Discharge: HOME | End: 2021-09-29
Payer: OTHER GOVERNMENT

## 2021-09-29 VITALS
HEART RATE: 66 BPM | SYSTOLIC BLOOD PRESSURE: 121 MMHG | DIASTOLIC BLOOD PRESSURE: 78 MMHG | OXYGEN SATURATION: 100 % | RESPIRATION RATE: 16 BRPM | TEMPERATURE: 98 F

## 2021-09-29 DIAGNOSIS — R30.0 DYSURIA: ICD-10-CM

## 2021-09-29 DIAGNOSIS — N89.8 VAGINAL DISCHARGE: Primary | ICD-10-CM

## 2021-09-29 PROCEDURE — 99214 OFFICE O/P EST MOD 30 MIN: CPT

## 2021-09-29 PROCEDURE — 87205 SMEAR GRAM STAIN: CPT | Performed by: PHYSICIAN ASSISTANT

## 2021-09-29 PROCEDURE — 87491 CHLMYD TRACH DNA AMP PROBE: CPT | Performed by: PHYSICIAN ASSISTANT

## 2021-09-29 PROCEDURE — 87106 FUNGI IDENTIFICATION YEAST: CPT | Performed by: PHYSICIAN ASSISTANT

## 2021-09-29 PROCEDURE — 87808 TRICHOMONAS ASSAY W/OPTIC: CPT | Performed by: PHYSICIAN ASSISTANT

## 2021-09-29 PROCEDURE — 87591 N.GONORRHOEAE DNA AMP PROB: CPT | Performed by: PHYSICIAN ASSISTANT

## 2021-09-29 PROCEDURE — 81002 URINALYSIS NONAUTO W/O SCOPE: CPT

## 2021-09-29 PROCEDURE — 81025 URINE PREGNANCY TEST: CPT

## 2021-09-29 PROCEDURE — 87086 URINE CULTURE/COLONY COUNT: CPT | Performed by: PHYSICIAN ASSISTANT

## 2021-09-29 RX ORDER — FLUCONAZOLE 150 MG/1
150 TABLET ORAL ONCE
Qty: 1 TABLET | Refills: 0 | Status: SHIPPED | OUTPATIENT
Start: 2021-09-29 | End: 2021-09-29

## 2021-09-29 RX ORDER — CEPHALEXIN 500 MG/1
500 CAPSULE ORAL 2 TIMES DAILY
Qty: 14 CAPSULE | Refills: 0 | Status: SHIPPED | OUTPATIENT
Start: 2021-09-29 | End: 2021-10-06

## 2021-09-29 NOTE — ED PROVIDER NOTES
Patient Seen in: [facility]      History   Patient presents with:  Urinary Symptoms    Stated Complaint: kidney pain     Subjective:   HPI    38 yo female here for evaluation of vaginal discharge, dysuria.  Pt states she has been having vagina Ear: External ear normal.      Nose: Nose normal.      Mouth/Throat:      Mouth: Mucous membranes are moist.   Eyes:      Extraocular Movements: Extraocular movements intact. Pupils: Pupils are equal, round, and reactive to light.    Cardiovascular: Impression:  Vaginal discharge  (primary encounter diagnosis)  Dysuria     Disposition:  Discharge  9/29/2021  9:29 am    Follow-up:  Marilyn Rivera PA-C  [place] [address]  [phone]                Medications Prescribed:  C

## 2021-09-29 NOTE — ED INITIAL ASSESSMENT (HPI)
Pt c/o right side lower back pain and urinary frequency x 2 days, states she also concern for STD wants chlamydia. C/o vaginal discharge x 3 days.

## 2021-10-01 ENCOUNTER — TELEPHONE (OUTPATIENT)
Dept: FAMILY MEDICINE CLINIC | Facility: CLINIC | Age: 36
End: 2021-10-01

## 2021-10-01 NOTE — TELEPHONE ENCOUNTER
Verified name and . Patient was seen in urgent care of 21 for symptoms as seen below. She states that she still has symptoms of lower back pain, inflammation and redness to vaginal area, urinary urgency.  She states that symptoms are starting to i

## 2021-10-02 NOTE — TELEPHONE ENCOUNTER
Patient reports that she was seen in the UC on 9/29 and all labs were normal; ua, urine culture, gc/chlam, vaginal culture. Today still with flank pain. Is still on kelfex as ordered and flushing with lots of water. Denies history of kidney stones.

## 2021-10-05 NOTE — TELEPHONE ENCOUNTER
Pt calling back and states symptoms have resolved, but pt is concerned she had blood in her urine. Pt states she did not have menses at the time of the lab and had it a few weeks prior. Pt asking if she needs her urine rechecked.   Please advise

## 2021-10-06 ENCOUNTER — LAB ENCOUNTER (OUTPATIENT)
Dept: LAB | Age: 36
End: 2021-10-06
Attending: PHYSICIAN ASSISTANT
Payer: OTHER GOVERNMENT

## 2021-10-06 DIAGNOSIS — R35.0 URINARY FREQUENCY: ICD-10-CM

## 2021-10-06 PROCEDURE — 81001 URINALYSIS AUTO W/SCOPE: CPT

## 2021-10-06 PROCEDURE — 87086 URINE CULTURE/COLONY COUNT: CPT

## 2021-10-06 NOTE — TELEPHONE ENCOUNTER
Spoke with patient ( verified) and relayed urine culture still in process--will be contacted with results--patient verbalizes understanding and agreement. No further questions/concerns at this time.

## 2021-10-12 ENCOUNTER — OFFICE VISIT (OUTPATIENT)
Dept: SURGERY | Facility: CLINIC | Age: 36
End: 2021-10-12
Payer: OTHER GOVERNMENT

## 2021-10-12 VITALS
SYSTOLIC BLOOD PRESSURE: 97 MMHG | BODY MASS INDEX: 25.58 KG/M2 | DIASTOLIC BLOOD PRESSURE: 57 MMHG | HEIGHT: 62 IN | WEIGHT: 139 LBS | HEART RATE: 73 BPM

## 2021-10-12 DIAGNOSIS — R35.0 URINE FREQUENCY: Primary | ICD-10-CM

## 2021-10-12 DIAGNOSIS — M54.50 ACUTE BILATERAL LOW BACK PAIN WITHOUT SCIATICA: ICD-10-CM

## 2021-10-12 PROCEDURE — 81003 URINALYSIS AUTO W/O SCOPE: CPT | Performed by: NURSE PRACTITIONER

## 2021-10-12 PROCEDURE — 3078F DIAST BP <80 MM HG: CPT | Performed by: NURSE PRACTITIONER

## 2021-10-12 PROCEDURE — 3008F BODY MASS INDEX DOCD: CPT | Performed by: NURSE PRACTITIONER

## 2021-10-12 PROCEDURE — 3074F SYST BP LT 130 MM HG: CPT | Performed by: NURSE PRACTITIONER

## 2021-10-12 PROCEDURE — 99243 OFF/OP CNSLTJ NEW/EST LOW 30: CPT | Performed by: NURSE PRACTITIONER

## 2021-10-12 RX ORDER — MULTIVIT-MIN/IRON FUM/FOLIC AC 7.5 MG-4
1 TABLET ORAL DAILY
COMMUNITY

## 2021-10-12 NOTE — PROGRESS NOTES
Subjective:     Patient ID: Shirlene Ormond is a 39year old female.     HPI     Patient is a 39year old female who presents to the clinic for a consult at the request of, and a copy of this note will be sent to, [employer] PA-C, regarding urinary ur mouth 2 (two) times daily.  60 tablet 0     Allergies:  Seasonal                ITCHING    Comment:Congestion    Past Medical History:   Diagnosis Date   • Decorative tattoo    • History of pregnancy , ,     missed ab,  x2, pregnancy managem Judgment: Judgment normal.         Assessment & Plan:   Urine frequency  (primary encounter diagnosis)  Acute bilateral low back pain without sciatica    Patient is a 39year old female who presents to the clinic for a consult regarding urinary frequency.

## 2021-10-13 ENCOUNTER — HOSPITAL ENCOUNTER (OUTPATIENT)
Dept: ULTRASOUND IMAGING | Age: 36
Discharge: HOME | End: 2021-10-13
Attending: NURSE PRACTITIONER
Payer: OTHER GOVERNMENT

## 2021-10-13 DIAGNOSIS — R35.0 URINE FREQUENCY: ICD-10-CM

## 2021-10-13 DIAGNOSIS — M54.50 ACUTE BILATERAL LOW BACK PAIN WITHOUT SCIATICA: ICD-10-CM

## 2021-10-13 PROCEDURE — 76775 US EXAM ABDO BACK WALL LIM: CPT | Performed by: NURSE PRACTITIONER

## 2021-10-15 ENCOUNTER — PATIENT MESSAGE (OUTPATIENT)
Dept: SURGERY | Facility: CLINIC | Age: 36
End: 2021-10-15

## 2021-10-15 NOTE — TELEPHONE ENCOUNTER
Below is patient's message sent via [employer]:     From: rUsula Robles  To: Christofer Cardenas.  MAGNO Alva  Sent: 10/15/2021  8:15 AM CDT  Subject: Question regarding URINE CULTURE, ROUTINE    What do the results mean with multiple species and probable contami

## 2021-10-26 RX ORDER — DEXTROAMPHETAMINE SACCHARATE, AMPHETAMINE ASPARTATE, DEXTROAMPHETAMINE SULFATE AND AMPHETAMINE SULFATE 1.25; 1.25; 1.25; 1.25 MG/1; MG/1; MG/1; MG/1
5 TABLET ORAL DAILY
Qty: 30 TABLET | Refills: 0 | OUTPATIENT
Start: 2021-11-26 | End: 2021-12-26

## 2021-10-26 RX ORDER — DEXTROAMPHETAMINE SACCHARATE, AMPHETAMINE ASPARTATE, DEXTROAMPHETAMINE SULFATE AND AMPHETAMINE SULFATE 1.25; 1.25; 1.25; 1.25 MG/1; MG/1; MG/1; MG/1
5 TABLET ORAL DAILY
Qty: 30 TABLET | Refills: 0 | OUTPATIENT
Start: 2021-10-26 | End: 2021-11-24

## 2021-10-26 RX ORDER — DEXTROAMPHETAMINE SACCHARATE, AMPHETAMINE ASPARTATE, DEXTROAMPHETAMINE SULFATE AND AMPHETAMINE SULFATE 1.25; 1.25; 1.25; 1.25 MG/1; MG/1; MG/1; MG/1
5 TABLET ORAL DAILY
Qty: 30 TABLET | Refills: 0 | OUTPATIENT
Start: 2021-12-27 | End: 2022-01-26

## 2021-10-26 NOTE — TELEPHONE ENCOUNTER
Pt requesting 90 day panel  Please review. Protocol failed/ No protocol      Requested Prescriptions   Pending Prescriptions Disp Refills    amphetamine-dextroamphetamine 5 MG Oral Tab 30 tablet 0     Sig: Take 1 tablet (5 mg total) by mouth daily.         Radha Kidd

## 2021-10-29 NOTE — TELEPHONE ENCOUNTER
Patient calling, identified name and , regarding Adderall refill  ( see note below )   Med pended for your review / approval

## 2021-10-30 RX ORDER — DEXTROAMPHETAMINE SACCHARATE, AMPHETAMINE ASPARTATE, DEXTROAMPHETAMINE SULFATE AND AMPHETAMINE SULFATE 1.25; 1.25; 1.25; 1.25 MG/1; MG/1; MG/1; MG/1
5 TABLET ORAL 2 TIMES DAILY
Qty: 60 TABLET | Refills: 0 | Status: SHIPPED | OUTPATIENT
Start: 2021-10-30 | End: 2021-11-29

## 2021-12-02 ENCOUNTER — TELEPHONE (OUTPATIENT)
Dept: FAMILY MEDICINE CLINIC | Facility: CLINIC | Age: 36
End: 2021-12-02

## 2021-12-02 NOTE — TELEPHONE ENCOUNTER
Pt would like a refill on her adderall medication (not listed) Pt is out of medication. Pharmacy: [facility]/[place] -[facility].   Pt is also requesting  a few scripts to be sent to the pharmacy for the same date but different months

## 2021-12-03 RX ORDER — DEXTROAMPHETAMINE SACCHARATE, AMPHETAMINE ASPARTATE, DEXTROAMPHETAMINE SULFATE AND AMPHETAMINE SULFATE 1.25; 1.25; 1.25; 1.25 MG/1; MG/1; MG/1; MG/1
5 TABLET ORAL 2 TIMES DAILY
Qty: 60 TABLET | Refills: 0 | Status: SHIPPED | OUTPATIENT
Start: 2022-02-01 | End: 2022-03-02

## 2021-12-03 RX ORDER — DEXTROAMPHETAMINE SACCHARATE, AMPHETAMINE ASPARTATE, DEXTROAMPHETAMINE SULFATE AND AMPHETAMINE SULFATE 1.25; 1.25; 1.25; 1.25 MG/1; MG/1; MG/1; MG/1
5 TABLET ORAL 2 TIMES DAILY
Qty: 60 TABLET | Refills: 0 | Status: SHIPPED | OUTPATIENT
Start: 2021-12-03 | End: 2022-01-02

## 2021-12-03 RX ORDER — DEXTROAMPHETAMINE SACCHARATE, AMPHETAMINE ASPARTATE, DEXTROAMPHETAMINE SULFATE AND AMPHETAMINE SULFATE 1.25; 1.25; 1.25; 1.25 MG/1; MG/1; MG/1; MG/1
5 TABLET ORAL 2 TIMES DAILY
Qty: 60 TABLET | Refills: 0 | Status: SHIPPED | OUTPATIENT
Start: 2022-01-02 | End: 2022-02-01

## 2022-01-05 RX ORDER — NORGESTIMATE AND ETHINYL ESTRADIOL 7DAYSX3 28
1 KIT ORAL DAILY
Qty: 28 TABLET | Refills: 0 | Status: SHIPPED | OUTPATIENT
Start: 2022-01-05 | End: 2022-01-13

## 2022-01-13 ENCOUNTER — TELEMEDICINE (OUTPATIENT)
Dept: FAMILY MEDICINE CLINIC | Facility: CLINIC | Age: 37
End: 2022-01-13

## 2022-01-13 ENCOUNTER — TELEPHONE (OUTPATIENT)
Dept: FAMILY MEDICINE CLINIC | Facility: CLINIC | Age: 37
End: 2022-01-13

## 2022-01-13 DIAGNOSIS — Z30.41 ENCOUNTER FOR BIRTH CONTROL PILLS MAINTENANCE: Primary | ICD-10-CM

## 2022-01-13 PROCEDURE — 99213 OFFICE O/P EST LOW 20 MIN: CPT | Performed by: PHYSICIAN ASSISTANT

## 2022-01-13 RX ORDER — NORGESTIMATE AND ETHINYL ESTRADIOL 7DAYSX3 28
1 KIT ORAL DAILY
Qty: 3 EACH | Refills: 3 | Status: SHIPPED | OUTPATIENT
Start: 2022-01-13 | End: 2022-04-13

## 2022-01-14 NOTE — PROGRESS NOTES
HPI: HPI    I spoke to Sheila Perkins via video call, verified date of birth, and discussed current concerns. Patient is doing fine at this time. Patient requests a refill of BCP.    Patient denies of chest pain, SOB, N/V/C/D, fever, dizziness, sync Maternal Grandmother    • Skin cancer Brother         melanoma   • Diabetes Brother    • Psychiatric Maternal Uncle         mental illness, paranoid schizophrenia   • Colon Cancer Maternal Grandfather        Social History:   Social History    Socioeconomi for chest pain and palpitations. Gastrointestinal: Negative for abdominal distention, abdominal pain, blood in stool, constipation, diarrhea, nausea and vomiting. Skin: Negative for rash. There were no vitals filed for this visit.   There is no h

## 2022-03-17 RX ORDER — DEXTROAMPHETAMINE SACCHARATE, AMPHETAMINE ASPARTATE, DEXTROAMPHETAMINE SULFATE AND AMPHETAMINE SULFATE 1.25; 1.25; 1.25; 1.25 MG/1; MG/1; MG/1; MG/1
5 TABLET ORAL 2 TIMES DAILY
Qty: 60 TABLET | Refills: 0 | Status: SHIPPED | OUTPATIENT
Start: 2022-03-17 | End: 2022-04-16

## 2022-03-17 RX ORDER — DEXTROAMPHETAMINE SACCHARATE, AMPHETAMINE ASPARTATE, DEXTROAMPHETAMINE SULFATE AND AMPHETAMINE SULFATE 1.25; 1.25; 1.25; 1.25 MG/1; MG/1; MG/1; MG/1
5 TABLET ORAL 2 TIMES DAILY
Qty: 60 TABLET | Refills: 0 | Status: SHIPPED | OUTPATIENT
Start: 2022-05-18 | End: 2022-06-17

## 2022-03-17 RX ORDER — DEXTROAMPHETAMINE SACCHARATE, AMPHETAMINE ASPARTATE, DEXTROAMPHETAMINE SULFATE AND AMPHETAMINE SULFATE 1.25; 1.25; 1.25; 1.25 MG/1; MG/1; MG/1; MG/1
5 TABLET ORAL 2 TIMES DAILY
Qty: 60 TABLET | Refills: 0 | Status: SHIPPED | OUTPATIENT
Start: 2022-04-17 | End: 2022-05-17

## 2022-03-17 RX ORDER — DEXTROAMPHETAMINE SACCHARATE, AMPHETAMINE ASPARTATE, DEXTROAMPHETAMINE SULFATE AND AMPHETAMINE SULFATE 1.25; 1.25; 1.25; 1.25 MG/1; MG/1; MG/1; MG/1
5 TABLET ORAL 2 TIMES DAILY
Qty: 60 TABLET | Refills: 0 | Status: CANCELLED | OUTPATIENT
Start: 2022-03-17 | End: 2022-04-15

## 2022-03-17 NOTE — TELEPHONE ENCOUNTER
Please review. Protocol failed or has no protocol. Requested Prescriptions   Pending Prescriptions Disp Refills    amphetamine-dextroamphetamine (ADDERALL) 5 MG Oral Tab 60 tablet 0     Sig: Take 1 tablet (5 mg total) by mouth 2 (two) times daily. There is no refill protocol information for this order        amphetamine-dextroamphetamine (ADDERALL) 5 MG Oral Tab 60 tablet 0     Sig: Take 1 tablet (5 mg total) by mouth 2 (two) times daily. There is no refill protocol information for this order        amphetamine-dextroamphetamine (ADDERALL) 5 MG Oral Tab 60 tablet 0     Sig: Take 1 tablet (5 mg total) by mouth 2 (two) times daily.         There is no refill protocol information for this order           Recent Outpatient Visits              2 months ago Encounter for birth control pills maintenance    [facility] Nancy Aguilar PA-C    Telemedicine    5 months ago Urine frequency    [facility], [address], [address], APRN    Office Visit    1 year ago ADD (attention deficit disorder) without hyperactivity    [facility] Nancy Aguilar PA-C    Telemedicine    1 year ago Close exposure to 2019 novel coronavirus    [facility] Nancy Aguilar PA-C    Virtual Phone E/M    1 year ago Close exposure to 2019 novel coronavirus    [address] [place], [place]    Telemedicine

## 2022-03-17 NOTE — TELEPHONE ENCOUNTER
Per patient she needs refill on her Adderall,  Patient is totally out of med.  Not in her latest med list.    Current Outpatient Medications   Medication Sig Dispense Refill

## 2022-07-05 ENCOUNTER — HOSPITAL ENCOUNTER (OUTPATIENT)
Age: 37
Discharge: HOME | End: 2022-07-05
Payer: COMMERCIAL

## 2022-07-05 VITALS
HEIGHT: 62 IN | OXYGEN SATURATION: 100 % | RESPIRATION RATE: 18 BRPM | SYSTOLIC BLOOD PRESSURE: 119 MMHG | TEMPERATURE: 98 F | BODY MASS INDEX: 23.92 KG/M2 | DIASTOLIC BLOOD PRESSURE: 83 MMHG | HEART RATE: 89 BPM | WEIGHT: 130 LBS

## 2022-07-05 DIAGNOSIS — N30.00 ACUTE CYSTITIS WITHOUT HEMATURIA: Primary | ICD-10-CM

## 2022-07-05 LAB
B-HCG UR QL: NEGATIVE
BILIRUB UR QL STRIP: NEGATIVE
CLARITY UR: CLEAR
COLOR UR: YELLOW
GLUCOSE UR STRIP-MCNC: NEGATIVE MG/DL
KETONES UR STRIP-MCNC: NEGATIVE MG/DL
NITRITE UR QL STRIP: NEGATIVE
PH UR STRIP: 5.5 [PH]
PROT UR STRIP-MCNC: NEGATIVE MG/DL
SP GR UR STRIP: 1.02
UROBILINOGEN UR STRIP-ACNC: <2 MG/DL

## 2022-07-05 PROCEDURE — 81025 URINE PREGNANCY TEST: CPT

## 2022-07-05 PROCEDURE — 81002 URINALYSIS NONAUTO W/O SCOPE: CPT

## 2022-07-05 PROCEDURE — 99213 OFFICE O/P EST LOW 20 MIN: CPT

## 2022-07-05 RX ORDER — CEPHALEXIN 500 MG/1
500 CAPSULE ORAL 2 TIMES DAILY
Qty: 14 CAPSULE | Refills: 0 | Status: SHIPPED | OUTPATIENT
Start: 2022-07-05 | End: 2022-07-12

## 2022-07-05 NOTE — ED INITIAL ASSESSMENT (HPI)
Per pt having urinary frequency and urgency since yesterday, denies any other complains at this time.

## 2022-08-15 DIAGNOSIS — Z30.41 ENCOUNTER FOR BIRTH CONTROL PILLS MAINTENANCE: ICD-10-CM

## 2022-08-15 RX ORDER — DEXTROAMPHETAMINE SACCHARATE, AMPHETAMINE ASPARTATE, DEXTROAMPHETAMINE SULFATE AND AMPHETAMINE SULFATE 1.25; 1.25; 1.25; 1.25 MG/1; MG/1; MG/1; MG/1
5 TABLET ORAL 2 TIMES DAILY
Qty: 60 TABLET | Refills: 0 | Status: SHIPPED | OUTPATIENT
Start: 2022-08-15 | End: 2022-09-14

## 2022-08-15 RX ORDER — NORGESTIMATE AND ETHINYL ESTRADIOL 7DAYSX3 28
1 KIT ORAL DAILY
Qty: 3 EACH | Refills: 3 | Status: SHIPPED | OUTPATIENT
Start: 2022-08-15 | End: 2022-11-13

## 2022-08-15 NOTE — TELEPHONE ENCOUNTER
Armani Payan, please advise    Patient (name and  verified) called for a refill for adderall and birth control. Patient states she was without insurance for a month and just now has insurance again so is asking for refills. Pharmacy and allergies verified. Order pended for approval/review.    Last OV was 22

## 2022-10-01 RX ORDER — DEXTROAMPHETAMINE SACCHARATE, AMPHETAMINE ASPARTATE, DEXTROAMPHETAMINE SULFATE AND AMPHETAMINE SULFATE 1.25; 1.25; 1.25; 1.25 MG/1; MG/1; MG/1; MG/1
5 TABLET ORAL 2 TIMES DAILY
Qty: 60 TABLET | Refills: 0 | Status: SHIPPED | OUTPATIENT
Start: 2022-10-01 | End: 2022-10-31

## 2022-10-01 NOTE — TELEPHONE ENCOUNTER
Spoke to patient. She is requesting her Adderall Rx. Relayed she is due for a physical. She will schedule online. Min, please advise on Rx.

## 2022-10-06 NOTE — TELEPHONE ENCOUNTER
Called and discussed with patient regarding her condition. Advise patient to take Adderall 5 mg PO BID and continue Excedrin for migraine headache. Patient verbalized understanding. 100

## 2022-12-07 RX ORDER — DEXTROAMPHETAMINE SACCHARATE, AMPHETAMINE ASPARTATE, DEXTROAMPHETAMINE SULFATE AND AMPHETAMINE SULFATE 1.25; 1.25; 1.25; 1.25 MG/1; MG/1; MG/1; MG/1
5 TABLET ORAL 2 TIMES DAILY
Qty: 60 TABLET | Refills: 0 | Status: SHIPPED | OUTPATIENT
Start: 2022-12-07 | End: 2023-01-06

## 2022-12-07 RX ORDER — DEXTROAMPHETAMINE SACCHARATE, AMPHETAMINE ASPARTATE, DEXTROAMPHETAMINE SULFATE AND AMPHETAMINE SULFATE 1.25; 1.25; 1.25; 1.25 MG/1; MG/1; MG/1; MG/1
5 TABLET ORAL 2 TIMES DAILY
Qty: 60 TABLET | Refills: 0 | OUTPATIENT
Start: 2023-01-07 | End: 2023-02-06

## 2022-12-07 RX ORDER — DEXTROAMPHETAMINE SACCHARATE, AMPHETAMINE ASPARTATE, DEXTROAMPHETAMINE SULFATE AND AMPHETAMINE SULFATE 1.25; 1.25; 1.25; 1.25 MG/1; MG/1; MG/1; MG/1
5 TABLET ORAL 2 TIMES DAILY
Qty: 60 TABLET | Refills: 0 | OUTPATIENT
Start: 2023-02-07 | End: 2023-03-09

## 2023-02-28 RX ORDER — DEXTROAMPHETAMINE SACCHARATE, AMPHETAMINE ASPARTATE, DEXTROAMPHETAMINE SULFATE AND AMPHETAMINE SULFATE 1.25; 1.25; 1.25; 1.25 MG/1; MG/1; MG/1; MG/1
5 TABLET ORAL 2 TIMES DAILY
Qty: 60 TABLET | Refills: 0 | OUTPATIENT
Start: 2023-02-28 | End: 2023-03-30

## 2023-02-28 RX ORDER — DEXTROAMPHETAMINE SACCHARATE, AMPHETAMINE ASPARTATE, DEXTROAMPHETAMINE SULFATE AND AMPHETAMINE SULFATE 1.25; 1.25; 1.25; 1.25 MG/1; MG/1; MG/1; MG/1
5 TABLET ORAL 2 TIMES DAILY
Qty: 60 TABLET | Refills: 0 | OUTPATIENT
Start: 2023-05-01 | End: 2023-05-31

## 2023-02-28 RX ORDER — DEXTROAMPHETAMINE SACCHARATE, AMPHETAMINE ASPARTATE, DEXTROAMPHETAMINE SULFATE AND AMPHETAMINE SULFATE 1.25; 1.25; 1.25; 1.25 MG/1; MG/1; MG/1; MG/1
5 TABLET ORAL 2 TIMES DAILY
Qty: 60 TABLET | Refills: 0 | OUTPATIENT
Start: 2023-03-31 | End: 2023-04-30

## 2023-02-28 NOTE — TELEPHONE ENCOUNTER
PagerDuty message sent to patient to schedule an office visit with PCP. Please make a phone attempts.

## 2023-02-28 NOTE — TELEPHONE ENCOUNTER
Patient asking for 3 months of refills for Adderall 5mg twice a day. Medication pended. Pharmacy verified as [facility] [place] [facility] [place] [address].

## 2023-03-02 NOTE — TELEPHONE ENCOUNTER
Pt is asking if she can make a phone visit for her ADD medicine. Please advise. She is also checking if her insurance is active, does not show active.

## 2023-06-16 DIAGNOSIS — F98.8 ADD (ATTENTION DEFICIT DISORDER) WITHOUT HYPERACTIVITY: ICD-10-CM

## 2023-06-16 RX ORDER — DEXTROAMPHETAMINE SACCHARATE, AMPHETAMINE ASPARTATE, DEXTROAMPHETAMINE SULFATE AND AMPHETAMINE SULFATE 1.25; 1.25; 1.25; 1.25 MG/1; MG/1; MG/1; MG/1
5 TABLET ORAL 2 TIMES DAILY
Qty: 60 TABLET | Refills: 0 | Status: SHIPPED | OUTPATIENT
Start: 2023-06-16 | End: 2023-07-16

## 2023-07-17 DIAGNOSIS — F98.8 ADD (ATTENTION DEFICIT DISORDER) WITHOUT HYPERACTIVITY: ICD-10-CM

## 2023-07-17 RX ORDER — DEXTROAMPHETAMINE SACCHARATE, AMPHETAMINE ASPARTATE, DEXTROAMPHETAMINE SULFATE AND AMPHETAMINE SULFATE 1.25; 1.25; 1.25; 1.25 MG/1; MG/1; MG/1; MG/1
5 TABLET ORAL 2 TIMES DAILY
Qty: 60 TABLET | Refills: 0 | Status: SHIPPED | OUTPATIENT
Start: 2023-07-17 | End: 2023-08-16

## 2023-07-17 NOTE — TELEPHONE ENCOUNTER
Per patient she needs refill on her Adderrall 5mg twice a day. Not in her current med list. Per patient is totally out of med.     Current Outpatient Medications   Medication Sig Dispense Refill

## 2023-07-17 NOTE — TELEPHONE ENCOUNTER
Please review. Protocol Failed or has No Protocol. Requested Prescriptions   Pending Prescriptions Disp Refills    amphetamine-dextroamphetamine (ADDERALL) 5 MG Oral Tab 60 tablet 0     Sig: Take 1 tablet (5 mg total) by mouth 2 (two) times daily.        There is no refill protocol information for this order              Recent Outpatient Visits              4 months ago ADD (attention deficit disorder) without hyperactivity    [facility], [address], [place] KatLigia Spangler    Telemedicine    1 year ago Encounter for birth control pills maintenance    [facility], [address], [place] Ligia Sanchez    Telemedicine    1 year ago Urine frequency    [address] [place],[address], [address], [place], [address], APRN    Office Visit    2 years ago ADD (attention deficit disorder) without hyperactivity    [facility], [address], [place] Ligia Sanchez    Telemedicine    2 years ago Close exposure to 2019 novel coronavirus    [facility], [address], [place] Lorie Null PA-C    [employer] E/M

## 2023-08-26 DIAGNOSIS — F98.8 ADD (ATTENTION DEFICIT DISORDER) WITHOUT HYPERACTIVITY: ICD-10-CM

## 2023-08-26 RX ORDER — DEXTROAMPHETAMINE SACCHARATE, AMPHETAMINE ASPARTATE, DEXTROAMPHETAMINE SULFATE AND AMPHETAMINE SULFATE 1.25; 1.25; 1.25; 1.25 MG/1; MG/1; MG/1; MG/1
5 TABLET ORAL DAILY
Qty: 30 TABLET | Refills: 0 | Status: SHIPPED | OUTPATIENT
Start: 2023-08-26 | End: 2023-09-25

## 2023-08-26 NOTE — TELEPHONE ENCOUNTER
Pt called for RX,Tele visit 3/9/23  Please review. Protocol failed/ No protocol      Requested Prescriptions   Pending Prescriptions Disp Refills    amphetamine-dextroamphetamine (ADDERALL) 5 MG Oral Tab 30 tablet 0     Sig: Take 1 tablet (5 mg total) by mouth daily.        There is no refill protocol information for this order          Future Appointments         Provider Department Appt Notes    In 6 days Harjinder Clarke PA-C [facility], [address], [place] pxp, last px 9/9/2020             Recent Outpatient Visits              5 months ago ADD (attention deficit disorder) without hyperactivity    [facility], [address], [place] Hennie [place], [place]    Telemedicine    1 year ago Encounter for birth control pills maintenance    [facility], [address], [place], [place]    Telemedicine    1 year ago Urine frequency    [address] [place],[address], [address], [place], [address], APRN    Office Visit    2 years ago ADD (attention deficit disorder) without hyperactivity    [facility] [address], [place], [place]    Telemedicine    2 years ago Close exposure to 2019 novel coronavirus    [facility], [address], [place] [facility]    [employer] E[facility]

## 2023-09-08 ENCOUNTER — OFFICE VISIT (OUTPATIENT)
Dept: FAMILY MEDICINE CLINIC | Facility: CLINIC | Age: 38
End: 2023-09-08

## 2023-09-08 VITALS
HEART RATE: 78 BPM | BODY MASS INDEX: 27.42 KG/M2 | DIASTOLIC BLOOD PRESSURE: 70 MMHG | WEIGHT: 149 LBS | SYSTOLIC BLOOD PRESSURE: 110 MMHG | HEIGHT: 62 IN

## 2023-09-08 DIAGNOSIS — Z01.419 ENCOUNTER FOR ROUTINE GYNECOLOGICAL EXAMINATION WITH PAPANICOLAOU SMEAR OF CERVIX: ICD-10-CM

## 2023-09-08 DIAGNOSIS — Z30.41 ENCOUNTER FOR BIRTH CONTROL PILLS MAINTENANCE: ICD-10-CM

## 2023-09-08 DIAGNOSIS — Z00.00 ROUTINE GENERAL MEDICAL EXAMINATION AT A HEALTH CARE FACILITY: Primary | ICD-10-CM

## 2023-09-08 DIAGNOSIS — F90.0 ATTENTION DEFICIT HYPERACTIVITY DISORDER (ADHD), PREDOMINANTLY INATTENTIVE TYPE: ICD-10-CM

## 2023-09-08 PROBLEM — Z30.011 ORAL CONTRACEPTION INITIAL PRESCRIPTION: Status: RESOLVED | Noted: 2019-07-23 | Resolved: 2023-09-08

## 2023-09-08 PROBLEM — F98.8 ADD (ATTENTION DEFICIT DISORDER): Status: ACTIVE | Noted: 2023-09-08

## 2023-09-08 PROCEDURE — 3074F SYST BP LT 130 MM HG: CPT | Performed by: PHYSICIAN ASSISTANT

## 2023-09-08 PROCEDURE — 3008F BODY MASS INDEX DOCD: CPT | Performed by: PHYSICIAN ASSISTANT

## 2023-09-08 PROCEDURE — 3078F DIAST BP <80 MM HG: CPT | Performed by: PHYSICIAN ASSISTANT

## 2023-09-08 PROCEDURE — 99395 PREV VISIT EST AGE 18-39: CPT | Performed by: PHYSICIAN ASSISTANT

## 2023-09-08 RX ORDER — DEXTROAMPHETAMINE SACCHARATE, AMPHETAMINE ASPARTATE, DEXTROAMPHETAMINE SULFATE AND AMPHETAMINE SULFATE 1.25; 1.25; 1.25; 1.25 MG/1; MG/1; MG/1; MG/1
5 TABLET ORAL 2 TIMES DAILY
Qty: 60 TABLET | Refills: 0 | Status: SHIPPED | OUTPATIENT
Start: 2023-09-08 | End: 2023-10-08

## 2023-09-08 RX ORDER — DEXTROAMPHETAMINE SACCHARATE, AMPHETAMINE ASPARTATE, DEXTROAMPHETAMINE SULFATE AND AMPHETAMINE SULFATE 1.25; 1.25; 1.25; 1.25 MG/1; MG/1; MG/1; MG/1
5 TABLET ORAL 2 TIMES DAILY
Qty: 60 TABLET | Refills: 0 | Status: SHIPPED | OUTPATIENT
Start: 2023-11-09 | End: 2023-12-09

## 2023-09-08 RX ORDER — NORGESTIMATE AND ETHINYL ESTRADIOL 7DAYSX3 28
1 KIT ORAL DAILY
Qty: 3 EACH | Refills: 3 | Status: SHIPPED | OUTPATIENT
Start: 2023-09-08 | End: 2023-12-07

## 2023-09-08 RX ORDER — DEXTROAMPHETAMINE SACCHARATE, AMPHETAMINE ASPARTATE, DEXTROAMPHETAMINE SULFATE AND AMPHETAMINE SULFATE 1.25; 1.25; 1.25; 1.25 MG/1; MG/1; MG/1; MG/1
5 TABLET ORAL 2 TIMES DAILY
Qty: 60 TABLET | Refills: 0 | Status: SHIPPED | OUTPATIENT
Start: 2023-10-09 | End: 2023-11-08

## 2023-09-10 LAB
GENITAL VAGINOSIS SCREEN: NEGATIVE
TRICHOMONAS SCREEN: NEGATIVE

## 2023-09-15 LAB — HPV I/H RISK 1 DNA SPEC QL NAA+PROBE: NEGATIVE

## 2023-10-26 ENCOUNTER — HOSPITAL ENCOUNTER (OUTPATIENT)
Age: 38
Discharge: HOME | End: 2023-10-26
Attending: EMERGENCY MEDICINE
Payer: OTHER GOVERNMENT

## 2023-10-26 VITALS
HEART RATE: 70 BPM | SYSTOLIC BLOOD PRESSURE: 105 MMHG | TEMPERATURE: 98 F | OXYGEN SATURATION: 100 % | RESPIRATION RATE: 20 BRPM | DIASTOLIC BLOOD PRESSURE: 67 MMHG

## 2023-10-26 DIAGNOSIS — H66.91 RIGHT OTITIS MEDIA, UNSPECIFIED OTITIS MEDIA TYPE: Primary | ICD-10-CM

## 2023-10-26 PROCEDURE — 99213 OFFICE O/P EST LOW 20 MIN: CPT

## 2023-10-26 RX ORDER — AMOXICILLIN 875 MG/1
875 TABLET, COATED ORAL 2 TIMES DAILY
Qty: 14 TABLET | Refills: 0 | Status: SHIPPED | OUTPATIENT
Start: 2023-10-26 | End: 2023-11-02

## 2023-10-26 RX ORDER — FLUCONAZOLE 150 MG/1
150 TABLET ORAL ONCE
Qty: 1 TABLET | Refills: 0 | Status: SHIPPED | OUTPATIENT
Start: 2023-10-26 | End: 2023-10-26

## 2023-10-26 NOTE — DISCHARGE INSTRUCTIONS
Thank you for visiting our immediate care for your health care needs. Please follow up with your regular doctor in the next 1-2 days. If you have any additional problems please return to the immediate care. Please take Tylenol and or Motrin for pain and fevers. Take all medications as prescribed.

## 2023-10-26 NOTE — ED INITIAL ASSESSMENT (HPI)
Patient arrived ambulatory to room c/o right ear pain that started last night. No drainage from ear. No recent swimming. Slight nasal congestion. No fevers.

## 2023-11-21 ENCOUNTER — HOSPITAL ENCOUNTER (OUTPATIENT)
Age: 38
Discharge: HOME | End: 2023-11-21
Payer: OTHER GOVERNMENT

## 2023-11-21 VITALS
RESPIRATION RATE: 16 BRPM | DIASTOLIC BLOOD PRESSURE: 81 MMHG | SYSTOLIC BLOOD PRESSURE: 136 MMHG | TEMPERATURE: 98 F | OXYGEN SATURATION: 100 % | HEART RATE: 72 BPM

## 2023-11-21 DIAGNOSIS — N39.0 ACUTE UTI: Primary | ICD-10-CM

## 2023-11-21 LAB
B-HCG UR QL: NEGATIVE
BILIRUB UR QL STRIP: NEGATIVE
COLOR UR: YELLOW
GLUCOSE UR STRIP-MCNC: NEGATIVE MG/DL
KETONES UR STRIP-MCNC: NEGATIVE MG/DL
NITRITE UR QL STRIP: NEGATIVE
PH UR STRIP: 6 [PH]
PROT UR STRIP-MCNC: NEGATIVE MG/DL
SP GR UR STRIP: 1.02
UROBILINOGEN UR STRIP-ACNC: <2 MG/DL

## 2023-11-21 PROCEDURE — 81514 NFCT DS BV&VAGINITIS DNA ALG: CPT | Performed by: NURSE PRACTITIONER

## 2023-11-21 PROCEDURE — 87086 URINE CULTURE/COLONY COUNT: CPT | Performed by: NURSE PRACTITIONER

## 2023-11-21 PROCEDURE — 81002 URINALYSIS NONAUTO W/O SCOPE: CPT

## 2023-11-21 PROCEDURE — 99214 OFFICE O/P EST MOD 30 MIN: CPT

## 2023-11-21 PROCEDURE — 87186 SC STD MICRODIL/AGAR DIL: CPT | Performed by: NURSE PRACTITIONER

## 2023-11-21 PROCEDURE — 87088 URINE BACTERIA CULTURE: CPT | Performed by: NURSE PRACTITIONER

## 2023-11-21 PROCEDURE — 81025 URINE PREGNANCY TEST: CPT

## 2023-11-21 PROCEDURE — 87591 N.GONORRHOEAE DNA AMP PROB: CPT | Performed by: NURSE PRACTITIONER

## 2023-11-21 PROCEDURE — 87491 CHLMYD TRACH DNA AMP PROBE: CPT | Performed by: NURSE PRACTITIONER

## 2023-11-21 RX ORDER — CEPHALEXIN 500 MG/1
500 CAPSULE ORAL 2 TIMES DAILY
Qty: 14 CAPSULE | Refills: 0 | Status: SHIPPED | OUTPATIENT
Start: 2023-11-21 | End: 2023-11-28

## 2023-11-21 RX ORDER — FLUCONAZOLE 150 MG/1
150 TABLET ORAL ONCE
Qty: 1 TABLET | Refills: 0 | Status: SHIPPED | OUTPATIENT
Start: 2023-11-21 | End: 2023-11-21

## 2023-11-21 NOTE — DISCHARGE INSTRUCTIONS
Drink plenty of fluids. Your urine and vaginal cultures are pending. Take the antibiotics as prescribed. Follow-up with your doctor. Return for any concerns.

## 2023-11-21 NOTE — ED INITIAL ASSESSMENT (HPI)
Patient arrives ambulatory with c/o odorous urine x 2-3 days, concerned for UTI. Patient also reports she would like to be checked for STDs.

## 2023-11-22 LAB
C TRACH DNA SPEC QL NAA+PROBE: NEGATIVE
N GONORRHOEA DNA SPEC QL NAA+PROBE: NEGATIVE

## 2023-11-24 LAB
BV BACTERIA DNA VAG QL NAA+PROBE: NEGATIVE
C GLABRATA DNA VAG QL NAA+PROBE: NEGATIVE
C KRUSEI DNA VAG QL NAA+PROBE: NEGATIVE
CANDIDA DNA VAG QL NAA+PROBE: NEGATIVE
T VAGINALIS DNA VAG QL NAA+PROBE: NEGATIVE

## 2023-12-11 ENCOUNTER — OFFICE VISIT (OUTPATIENT)
Dept: INTERNAL MEDICINE CLINIC | Facility: CLINIC | Age: 38
End: 2023-12-11

## 2023-12-11 ENCOUNTER — NURSE TRIAGE (OUTPATIENT)
Dept: FAMILY MEDICINE CLINIC | Facility: CLINIC | Age: 38
End: 2023-12-11

## 2023-12-11 VITALS
HEART RATE: 66 BPM | WEIGHT: 154 LBS | TEMPERATURE: 98 F | DIASTOLIC BLOOD PRESSURE: 73 MMHG | SYSTOLIC BLOOD PRESSURE: 112 MMHG | HEIGHT: 62 IN | OXYGEN SATURATION: 99 % | BODY MASS INDEX: 28.34 KG/M2

## 2023-12-11 DIAGNOSIS — T78.40XA ALLERGIC REACTION TO DRUG, INITIAL ENCOUNTER: Primary | ICD-10-CM

## 2023-12-11 RX ORDER — METHYLPREDNISOLONE 4 MG/1
TABLET ORAL
Qty: 1 EACH | Refills: 0 | Status: SHIPPED | OUTPATIENT
Start: 2023-12-11

## 2023-12-11 RX ORDER — IBUPROFEN 200 MG
200 TABLET ORAL EVERY 6 HOURS PRN
COMMUNITY

## 2023-12-11 RX ORDER — DEXTROAMPHETAMINE SACCHARATE, AMPHETAMINE ASPARTATE, DEXTROAMPHETAMINE SULFATE AND AMPHETAMINE SULFATE 1.25; 1.25; 1.25; 1.25 MG/1; MG/1; MG/1; MG/1
5 TABLET ORAL 2 TIMES DAILY
COMMUNITY

## 2023-12-11 NOTE — TELEPHONE ENCOUNTER
Pt stated that her s/sx started on sunday morning . She woke up with a light headache so she took 4 motrin. Pt then started to feel nauseous and then at 2 pm she started to feel chills and sweating and she just brushed it off. Then later she throw up and it was something orange. Per pt she thinks it was the Ibuprofen since she took it on a empty stomach. Then she heard and felt a  big pop in her jaw and her face started to swell up her eye were swollen shut. Pt then took benadryl and applied ice on her face and then it went down in about 40 min. Today the swelling is resolved just some swelling under her eyes. She never felt her throat was closing up or her tongue was swelling up. Pt today just feels some tenderness on her ears and jaw. This morning she felt a little sore throat. Pt is wanting to get allergy test done. Pt does not remember taking or applying anything new. Pt as given a appt for today. Pt not able to come in earlier as she has to take her mother to a appt. Future Appointments   Date Time Provider Caren Bond   12/11/2023  3:40 PM Jing Judd MD [facility]             Reason for Disposition   Patient wants to be seen     Pt wanting to get tested for allergies. Protocols used:  [id number]

## 2023-12-11 NOTE — TELEPHONE ENCOUNTER
Pt stated that her s/sx started on sunday morning . She woke up with a light headache so she took 4 motrin. Pt then started to feel nauseous and then at 2 pm she started to feel chills and sweating and she just brushed it off. Then later she throw up and it was something orange. Per pt she thinks it was the Ibuprofen since she took it on a empty stomach. Then she heard and felt a  big pop in her jaw and her face started to swell up her eye were swollen shut. Pt then took benadryl and applied ice on her face and then it went down in about 40 min. Today the swelling is resolved just some swelling under her eyes. She never felt her throat was closing up or her tongue was swelling up. Pt today just feels some tenderness on her ears and jaw. This morning she felt a little sore throat. Pt is wanting to get allergy test done. Pt does not remember taking or applying anything new. Pt as given a appt for today. Pt not able to come in earlier as she has to take her mother to a appt.      Future Appointments   Date Time Provider Caren Bond   12/11/2023  3:40 PM MD HIMANSHU Perez

## 2024-03-30 ENCOUNTER — TELEPHONE (OUTPATIENT)
Dept: FAMILY MEDICINE CLINIC | Facility: CLINIC | Age: 39
End: 2024-03-30

## 2024-04-30 DIAGNOSIS — F90.0 ATTENTION DEFICIT HYPERACTIVITY DISORDER (ADHD), PREDOMINANTLY INATTENTIVE TYPE: ICD-10-CM

## 2024-04-30 RX ORDER — DEXTROAMPHETAMINE SACCHARATE, AMPHETAMINE ASPARTATE, DEXTROAMPHETAMINE SULFATE AND AMPHETAMINE SULFATE 1.25; 1.25; 1.25; 1.25 MG/1; MG/1; MG/1; MG/1
5 TABLET ORAL 2 TIMES DAILY
Qty: 60 TABLET | Refills: 0 | Status: SHIPPED | OUTPATIENT
Start: 2024-04-30 | End: 2024-05-30

## 2024-04-30 NOTE — TELEPHONE ENCOUNTER
Patient is requesting a refill and mentions she does not have any medication as the pharmacy has been out of stock and now has the medication in stock    Shweta Barney in [place] confirmed.

## 2024-04-30 NOTE — TELEPHONE ENCOUNTER
Please review; protocol failed/No Protocol    Recent Fills: 01/24/2024    Last Office Visit: 09/08/2023    Requested Prescriptions   Pending Prescriptions Disp Refills    amphetamine-dextroamphetamine (ADDERALL) 5 MG Oral Tab 60 tablet 0     Sig: Take 1 tablet (5 mg total) by mouth 2 (two) times daily.       Controlled Substance Medication Failed - 4/30/2024  1:46 PM        Failed - This medication is a controlled substance - forward to provider to refill             Recent Outpatient Visits              4 months ago Allergic reaction to drug, initial encounter    [facility], Samuel Sawyer MD    Office Visit    7 months ago Routine general medical examination at a health care facility    [facility], [place], Minhxuyen, PA-C    Office Visit    1 year ago ADD (attention deficit disorder) without hyperactivity    [facility], [place], Minhxuyen, PA-C    Telemedicine    2 years ago Encounter for birth control pills maintenance    [facility], [place], Minhxuyen, PA-C    Telemedicine    2 years ago Urine frequency    [facility], Debbie Hanley APRN    Office Visit

## 2024-08-15 DIAGNOSIS — Z30.41 ENCOUNTER FOR BIRTH CONTROL PILLS MAINTENANCE: ICD-10-CM

## 2024-08-16 RX ORDER — NORGESTIMATE AND ETHINYL ESTRADIOL 7DAYSX3 28
1 KIT ORAL DAILY
Qty: 84 TABLET | Refills: 0 | Status: SHIPPED | OUTPATIENT
Start: 2024-08-16 | End: 2024-11-09

## 2024-08-16 NOTE — TELEPHONE ENCOUNTER
=please assist     LAST PHYSICAL 9/8/23.     No future appointments.     Scheduling instruction sent via [employer] .       PAP SMEAR 9/8/23;    Luan Boyer PA-C  9/15/2023 11:57 AM CDT       Pap and HPV are negative. Repeat Pap in 3 years.           Refill passed per [facility] protocol.     Requested Prescriptions   Pending Prescriptions Disp Refills    TRI-SPRINTEC 0.18/0.215/0.25 MG-35 MCG Oral Tab [Pharmacy Med Name: TRI-SPRINTEC TABLETS 28S] 84 tablet 0     Sig: TAKE 1 TABLET BY MOUTH DAILY       Gynecology Medication Protocol Passed - 8/15/2024 10:58 AM        Passed - PASS-PENDING LAST PAP WNL--VIA MANUAL LOOKUP        Passed - Physical or Pelvic/Breast in past 12 or next 3 mos--VIA MANUAL LOOKUP                   Recent Outpatient Visits              8 months ago [facility]    [facility]    11 months ago Routine general medical examination at a health care facility    [facility], [place], Minhxuyen, PA-C    Office Visit    1 year ago ADD (attention deficit disorder) without hyperactivity    [facility], [place], Minhxuyen, PA-C    Telemedicine    2 years ago Encounter for birth control pills maintenance    [facility], [place], Minhxuyen, PA-C    Telemedicine    2 years ago Urine frequency    [facility] Debbie Alva APRN    Office Visit

## 2024-09-13 DIAGNOSIS — F90.0 ATTENTION DEFICIT HYPERACTIVITY DISORDER (ADHD), PREDOMINANTLY INATTENTIVE TYPE: ICD-10-CM

## 2024-09-13 RX ORDER — DEXTROAMPHETAMINE SACCHARATE, AMPHETAMINE ASPARTATE, DEXTROAMPHETAMINE SULFATE AND AMPHETAMINE SULFATE 1.25; 1.25; 1.25; 1.25 MG/1; MG/1; MG/1; MG/1
5 TABLET ORAL 2 TIMES DAILY
Qty: 60 TABLET | Refills: 0 | OUTPATIENT
Start: 2024-09-13

## 2024-09-13 NOTE — TELEPHONE ENCOUNTER
Patient called, verified Name and . She is requesting for refill of amphetamine-dextroamphetamine. She is out of medication. Medication pended to run through protocol. Verified pharmacy.

## 2024-09-13 NOTE — TELEPHONE ENCOUNTER
Please review. Protocol Failed; No Protocol      Recent fills: 4/30/2024, 7/10/2024  Last Rx written: 7/10/2024  Last office visit: 9/8/2023    [employer] message sent to patient to schedule an office visit with Provider and/or  Routed to Patient  for assistance with appointment.         Requested Prescriptions   Pending Prescriptions Disp Refills    amphetamine-dextroamphetamine 5 MG Oral Tab 60 tablet 0     Sig: Take 1 tablet (5 mg total) by mouth 2 (two) times daily.       Controlled Substance Medication Failed - 9/13/2024  1:28 PM        Failed - This medication is a controlled substance - forward to provider to refill                 Recent Outpatient Visits              9 months ago Allergic reaction to drug, initial encounter    [facility], Samuel Sawyer MD    Office Visit    1 year ago Routine general medical examination at a health care facility    [facility], [place], Minhxuyen, PA-C    Office Visit    1 year ago ADD (attention deficit disorder) without hyperactivity    [facility], [place], Minhxuyen, PA-C    Telemedicine    2 years ago Encounter for birth control pills maintenance    [facility], [place], Minhxuyen, PA-C    Telemedicine    2 years ago [facility]    [facility] Visit

## 2024-09-27 RX ORDER — DEXTROAMPHETAMINE SACCHARATE, AMPHETAMINE ASPARTATE, DEXTROAMPHETAMINE SULFATE AND AMPHETAMINE SULFATE 1.25; 1.25; 1.25; 1.25 MG/1; MG/1; MG/1; MG/1
5 TABLET ORAL 2 TIMES DAILY
Qty: 60 TABLET | Refills: 0 | Status: SHIPPED | OUTPATIENT
Start: 2024-09-27

## 2024-09-27 NOTE — TELEPHONE ENCOUNTER
Patient scheduled her physical. She is out of medication. Medication pended for your review and approval.      Future Appointments   Date Time Provider Department Center   10/7/2024 11:30 AM Luan Boyer PA-C [facility] [place]

## 2024-11-04 DIAGNOSIS — F90.0 ATTENTION DEFICIT HYPERACTIVITY DISORDER (ADHD), PREDOMINANTLY INATTENTIVE TYPE: ICD-10-CM

## 2024-11-04 NOTE — TELEPHONE ENCOUNTER
Patient called to request a refill on below medication. Ray on file verified.     Patient is scheduled 11/6 for physical exam.     Medication Quantity Refills Start End   amphetamine-dextroamphetamine 5 MG Oral Tab 60 tablet 0 9/27/2024 --   Sig:   Take 1 tablet (5 mg total) by mouth 2 (two) times daily.     Route:   Oral     Earliest Fill Date:   9/27/2024     Order #:   [id number]

## 2024-11-04 NOTE — TELEPHONE ENCOUNTER
Recent Fills: 07/10/2024, 09/27/2024    Last Rx Written: 09/27/2024    Last Office Visit: 12/11/2023  Future Appointments   Date Time Provider Department Center   11/6/2024 11:00 AM Luan Boyer PA-C [facility] [place]

## 2024-11-06 ENCOUNTER — LAB ENCOUNTER (OUTPATIENT)
Dept: LAB | Age: 39
End: 2024-11-06
Attending: PHYSICIAN ASSISTANT
Payer: OTHER GOVERNMENT

## 2024-11-06 ENCOUNTER — OFFICE VISIT (OUTPATIENT)
Dept: FAMILY MEDICINE CLINIC | Facility: CLINIC | Age: 39
End: 2024-11-06

## 2024-11-06 ENCOUNTER — TELEPHONE (OUTPATIENT)
Dept: FAMILY MEDICINE CLINIC | Facility: CLINIC | Age: 39
End: 2024-11-06

## 2024-11-06 VITALS
SYSTOLIC BLOOD PRESSURE: 134 MMHG | BODY MASS INDEX: 27.05 KG/M2 | HEIGHT: 62 IN | HEART RATE: 66 BPM | WEIGHT: 147 LBS | DIASTOLIC BLOOD PRESSURE: 81 MMHG

## 2024-11-06 DIAGNOSIS — Z01.818 TUBAL LIGATION EVALUATION: ICD-10-CM

## 2024-11-06 DIAGNOSIS — Z00.00 ROUTINE GENERAL MEDICAL EXAMINATION AT A HEALTH CARE FACILITY: ICD-10-CM

## 2024-11-06 DIAGNOSIS — Z00.00 ROUTINE GENERAL MEDICAL EXAMINATION AT A HEALTH CARE FACILITY: Primary | ICD-10-CM

## 2024-11-06 DIAGNOSIS — Z88.6: ICD-10-CM

## 2024-11-06 DIAGNOSIS — F90.0 ATTENTION DEFICIT HYPERACTIVITY DISORDER (ADHD), PREDOMINANTLY INATTENTIVE TYPE: ICD-10-CM

## 2024-11-06 DIAGNOSIS — Z12.31 ENCOUNTER FOR SCREENING MAMMOGRAM FOR BREAST CANCER: ICD-10-CM

## 2024-11-06 LAB
ALBUMIN SERPL-MCNC: 4.3 G/DL (ref 3.2–4.8)
ALBUMIN/GLOB SERPL: 1.5 {RATIO} (ref 1–2)
ALP LIVER SERPL-CCNC: 65 U/L
ALT SERPL-CCNC: 14 U/L
ANION GAP SERPL CALC-SCNC: 4 MMOL/L (ref 0–18)
AST SERPL-CCNC: 16 U/L (ref ?–34)
BASOPHILS # BLD AUTO: 0.04 X10(3) UL (ref 0–0.2)
BASOPHILS NFR BLD AUTO: 0.6 %
BILIRUB SERPL-MCNC: 0.6 MG/DL (ref 0.3–1.2)
BUN BLD-MCNC: 9 MG/DL (ref 9–23)
BUN/CREAT SERPL: 10.2 (ref 10–20)
CALCIUM BLD-MCNC: 9.8 MG/DL (ref 8.7–10.4)
CHLORIDE SERPL-SCNC: 106 MMOL/L (ref 98–112)
CHOLEST SERPL-MCNC: 217 MG/DL (ref ?–200)
CO2 SERPL-SCNC: 28 MMOL/L (ref 21–32)
CREAT BLD-MCNC: 0.88 MG/DL
DEPRECATED RDW RBC AUTO: 42.1 FL (ref 35.1–46.3)
EGFRCR SERPLBLD CKD-EPI 2021: 85 ML/MIN/1.73M2 (ref 60–?)
EOSINOPHIL # BLD AUTO: 0.11 X10(3) UL (ref 0–0.7)
EOSINOPHIL NFR BLD AUTO: 1.6 %
ERYTHROCYTE [DISTWIDTH] IN BLOOD BY AUTOMATED COUNT: 12.8 % (ref 11–15)
FASTING PATIENT LIPID ANSWER: YES
FASTING STATUS PATIENT QL REPORTED: YES
GLOBULIN PLAS-MCNC: 2.8 G/DL (ref 2–3.5)
GLUCOSE BLD-MCNC: 82 MG/DL (ref 70–99)
HCT VFR BLD AUTO: 43.1 %
HDLC SERPL-MCNC: 53 MG/DL (ref 40–59)
HGB BLD-MCNC: 14 G/DL
IMM GRANULOCYTES # BLD AUTO: 0.01 X10(3) UL (ref 0–1)
IMM GRANULOCYTES NFR BLD: 0.1 %
LDLC SERPL CALC-MCNC: 126 MG/DL (ref ?–100)
LYMPHOCYTES # BLD AUTO: 2.5 X10(3) UL (ref 1–4)
LYMPHOCYTES NFR BLD AUTO: 36.1 %
MCH RBC QN AUTO: 28.9 PG (ref 26–34)
MCHC RBC AUTO-ENTMCNC: 32.5 G/DL (ref 31–37)
MCV RBC AUTO: 89 FL
MONOCYTES # BLD AUTO: 0.38 X10(3) UL (ref 0.1–1)
MONOCYTES NFR BLD AUTO: 5.5 %
NEUTROPHILS # BLD AUTO: 3.89 X10 (3) UL (ref 1.5–7.7)
NEUTROPHILS # BLD AUTO: 3.89 X10(3) UL (ref 1.5–7.7)
NEUTROPHILS NFR BLD AUTO: 56.1 %
NONHDLC SERPL-MCNC: 164 MG/DL (ref ?–130)
OSMOLALITY SERPL CALC.SUM OF ELEC: 284 MOSM/KG (ref 275–295)
PLATELET # BLD AUTO: 312 10(3)UL (ref 150–450)
POTASSIUM SERPL-SCNC: 4.2 MMOL/L (ref 3.5–5.1)
PROT SERPL-MCNC: 7.1 G/DL (ref 5.7–8.2)
RBC # BLD AUTO: 4.84 X10(6)UL
SODIUM SERPL-SCNC: 138 MMOL/L (ref 136–145)
TRIGL SERPL-MCNC: 218 MG/DL (ref 30–149)
TSI SER-ACNC: 1.04 UIU/ML (ref 0.55–4.78)
VLDLC SERPL CALC-MCNC: 39 MG/DL (ref 0–30)
WBC # BLD AUTO: 6.9 X10(3) UL (ref 4–11)

## 2024-11-06 PROCEDURE — 80053 COMPREHEN METABOLIC PANEL: CPT

## 2024-11-06 PROCEDURE — 85025 COMPLETE CBC W/AUTO DIFF WBC: CPT

## 2024-11-06 PROCEDURE — 99396 PREV VISIT EST AGE 40-64: CPT | Performed by: PHYSICIAN ASSISTANT

## 2024-11-06 PROCEDURE — 80061 LIPID PANEL: CPT

## 2024-11-06 PROCEDURE — 36415 COLL VENOUS BLD VENIPUNCTURE: CPT

## 2024-11-06 PROCEDURE — 84443 ASSAY THYROID STIM HORMONE: CPT

## 2024-11-06 RX ORDER — DEXTROAMPHETAMINE SACCHARATE, AMPHETAMINE ASPARTATE, DEXTROAMPHETAMINE SULFATE AND AMPHETAMINE SULFATE 1.25; 1.25; 1.25; 1.25 MG/1; MG/1; MG/1; MG/1
5 TABLET ORAL 2 TIMES DAILY
Qty: 60 TABLET | Refills: 0 | Status: SHIPPED | OUTPATIENT
Start: 2025-01-07 | End: 2025-02-06

## 2024-11-06 RX ORDER — DEXTROAMPHETAMINE SACCHARATE, AMPHETAMINE ASPARTATE, DEXTROAMPHETAMINE SULFATE AND AMPHETAMINE SULFATE 1.25; 1.25; 1.25; 1.25 MG/1; MG/1; MG/1; MG/1
5 TABLET ORAL 2 TIMES DAILY
Qty: 60 TABLET | Refills: 0 | Status: SHIPPED | OUTPATIENT
Start: 2024-12-07 | End: 2025-01-06

## 2024-11-06 RX ORDER — DEXTROAMPHETAMINE SACCHARATE, AMPHETAMINE ASPARTATE, DEXTROAMPHETAMINE SULFATE AND AMPHETAMINE SULFATE 1.25; 1.25; 1.25; 1.25 MG/1; MG/1; MG/1; MG/1
5 TABLET ORAL 2 TIMES DAILY
Qty: 60 TABLET | Refills: 0 | Status: SHIPPED | OUTPATIENT
Start: 2024-11-06 | End: 2024-12-06

## 2024-11-06 NOTE — PROGRESS NOTES
HPI:   Marlin Metzger is a 39 year old female who presents for an Annual Health Visit.   The patient wants to have tubal ligation. She also wants an allergy test for Ibuprofen because she had a swollen facial when taking Ibuprofen.  She is doing fine at this time. The patient denies chest pain, SOB, N/V/C/D, fever, dizziness, syncope, and abdominal pain. There are no other concerns today.      Allergies:   Allergies[1]    CURRENT MEDICATIONS   Current Outpatient Medications   Medication Sig Dispense Refill    amphetamine-dextroamphetamine (ADDERALL) 5 MG Oral Tab Take 1 tablet (5 mg total) by mouth 2 (two) times daily. 60 tablet 0    [START ON 2024] amphetamine-dextroamphetamine (ADDERALL) 5 MG Oral Tab Take 1 tablet (5 mg total) by mouth 2 (two) times daily. 60 tablet 0    [START ON 2025] amphetamine-dextroamphetamine (ADDERALL) 5 MG Oral Tab Take 1 tablet (5 mg total) by mouth 2 (two) times daily. 60 tablet 0    amphetamine-dextroamphetamine 5 MG Oral Tab Take 1 tablet (5 mg total) by mouth 2 (two) times daily. 60 tablet 0    Norgestim-Eth Estrad Triphasic (TRI-SPRINTEC) 0.18/0.215/0.25 MG-35 MCG Oral Tab Take 1 tablet by mouth daily. 84 tablet 0    Multiple Vitamins-Minerals (MULTI-VITAMIN/MINERALS) Oral Tab Take 1 tablet by mouth daily.        HISTORICAL INFORMATION   Past Medical History:    Decorative tattoo    History of pregnancy    missed ab,  x2, pregnancy management 2014- pitocin x2    Human papilloma virus infection    LSIL (low grade squamous intraepithelial lesion) on Pap smear    abnormal pap smear      Past Surgical History:   Procedure Laterality Date    Breast surgery Bilateral     [place] teeth removed        Family History   Problem Relation Age of Onset    Musculo-skelatal Disorder Mother         multiple sclerosis    Stroke Mother     Lipids Mother     Hypertension Maternal Grandmother     Skin cancer Maternal Grandmother         basal cell    Lipids Maternal Grandmother      Skin cancer Brother         melanoma    Diabetes Brother     Psychiatric Maternal Uncle         mental illness, paranoid schizophrenia    Colon Cancer Maternal Grandfather       SOCIAL HISTORY   Social History     Socioeconomic History    Marital status:    Tobacco Use    Smoking status: Never     Passive exposure: Never    Smokeless tobacco: Never   Vaping Use    Vaping status: Never Used   Substance and Sexual Activity    Alcohol use: Not Currently     Comment: very rare    Drug use: No   Other Topics Concern    Caffeine Concern Yes     Comment: coffee, soda 1 cup     Social History     Social History Narrative    Not on file        REVIEW OF SYSTEMS:     Review of Systems   Constitutional: Negative.    HENT: Negative.     Eyes: Negative.    Respiratory: Negative.     Cardiovascular: Negative.    Gastrointestinal: Negative.    Genitourinary: Negative.    Musculoskeletal: Negative.    Skin: Negative.    Neurological: Negative.    Psychiatric/Behavioral: Negative.           EXAM:   /81   Pulse 66   Ht 5' 2\" (1.575 m)   Wt 147 lb (66.7 kg)   BMI 26.89 kg/m²    Wt Readings from Last 6 Encounters:   11/06/24 147 lb (66.7 kg)   12/11/23 154 lb (69.9 kg)   09/08/23 149 lb (67.6 kg)   07/05/22 130 lb (59 kg)   10/12/21 139 lb (63 kg)   09/09/20 139 lb 8 oz (63.3 kg)     Body mass index is 26.89 kg/m².    Physical Exam  Vitals reviewed.   Constitutional:       Appearance: She is well-developed.   HENT:      Head: Normocephalic and atraumatic.      Right Ear: Tympanic membrane, ear canal and external ear normal. There is no impacted cerumen.      Left Ear: Tympanic membrane, ear canal and external ear normal. There is no impacted cerumen.      Nose: Nose normal.      Mouth/Throat:      Mouth: Mucous membranes are moist.      Pharynx: Oropharynx is clear. No oropharyngeal exudate or posterior oropharyngeal erythema.   Eyes:      General:         Right eye: No discharge.         Left eye: No discharge.       Conjunctiva/sclera: Conjunctivae normal.   Cardiovascular:      Rate and Rhythm: Normal rate and regular rhythm.      Heart sounds: Normal heart sounds.   Pulmonary:      Effort: Pulmonary effort is normal.      Breath sounds: Normal breath sounds.   Chest:      Chest wall: No mass, lacerations, deformity, swelling or tenderness.   Breasts:     Breasts are symmetrical.      Right: Normal. No inverted nipple, mass, nipple discharge, skin change or tenderness.      Left: Normal. No inverted nipple, mass, nipple discharge, skin change or tenderness.   Abdominal:      General: Abdomen is flat. Bowel sounds are normal. There is no distension.      Palpations: Abdomen is soft.      Tenderness: There is no abdominal tenderness. There is no right CVA tenderness or left CVA tenderness.   Genitourinary:     Vagina: Normal.   Musculoskeletal:         General: Normal range of motion.      Cervical back: Normal range of motion and neck supple.   Lymphadenopathy:      Upper Body:      Right upper body: No supraclavicular or axillary adenopathy.      Left upper body: No supraclavicular or axillary adenopathy.   Skin:     Findings: No rash.   Neurological:      Mental Status: She is alert and oriented to person, place, and time.   Psychiatric:         Mood and Affect: Mood normal.         Behavior: Behavior normal.         Thought Content: Thought content normal.         Judgment: Judgment normal.          ASSESSMENT AND PLAN:   Marlin was seen today for routine physical.    Diagnoses and all orders for this visit:    Routine general medical examination at a health care facility  -     CBC With Differential With Platelet; Future  -     Comp Metabolic Panel (14); Future  -     Lipid Panel; Future  -     TSH W Reflex To Free T4; Future    Encounter for screening mammogram for breast cancer  -     Cancel: [facility] ABEBE 2D+3D SCREENING BILAT (CPT=77067/93366); Future  -     [facility] ABEBE 2D+3D SCREENING AUGMT BILAT(05309/91154); Future    Tubal  ligation evaluation  -     OBG Referral - In Network    Attention deficit hyperactivity disorder (ADHD), predominantly inattentive type  -     amphetamine-dextroamphetamine (ADDERALL) 5 MG Oral Tab; Take 1 tablet (5 mg total) by mouth 2 (two) times daily.  -     amphetamine-dextroamphetamine (ADDERALL) 5 MG Oral Tab; Take 1 tablet (5 mg total) by mouth 2 (two) times daily.  -     amphetamine-dextroamphetamine (ADDERALL) 5 MG Oral Tab; Take 1 tablet (5 mg total) by mouth 2 (two) times daily.    Allergy to analgesic agent  -     Allergy Referral - In Network    Overall health discussed, exercise/activity appropriate for age and health status, heathy diety, preventive care, and upcoming screening discussed. Routine labs ordered.    There are no Patient Instructions on file for this visit.    The patient indicates understanding of these issues and agrees to the plan.    Problem List:  Patient Active Problem List   Diagnosis    ADD (attention deficit disorder)       Luan Boyer PA-C  11/6/2024  11:11 AM               [1]   Allergies  Allergen Reactions    Alcohol HIVES     October 2023 (after 2 shots fireball)    Motrin [Ibuprofen] ANGIOEDEMA    Seasonal ITCHING     Congestion

## 2024-11-06 NOTE — TELEPHONE ENCOUNTER
Patient called, verified Name and . States she was seen today and was referred to OB-GYN for tubal ligation surgery and is requesting for CPT code. Chart reviewed and diagnosis is for tubal ligation evaluation.     ALBERTO Epperson please advise if you are able to provide this information for the patient.

## 2024-11-08 DIAGNOSIS — Z30.41 ENCOUNTER FOR BIRTH CONTROL PILLS MAINTENANCE: ICD-10-CM

## 2024-11-08 RX ORDER — DEXTROAMPHETAMINE SACCHARATE, AMPHETAMINE ASPARTATE, DEXTROAMPHETAMINE SULFATE AND AMPHETAMINE SULFATE 1.25; 1.25; 1.25; 1.25 MG/1; MG/1; MG/1; MG/1
5 TABLET ORAL 2 TIMES DAILY
Qty: 60 TABLET | Refills: 0 | OUTPATIENT
Start: 2024-11-08

## 2024-11-08 NOTE — TELEPHONE ENCOUNTER
Please review; protocol failed. Or has no protocol    Requested Prescriptions   Pending Prescriptions Disp Refills    TRI-SPRINTEC 0.18/0.215/0.25 MG-35 MCG Oral Tab [Pharmacy Med Name: TRI-SPRINTEC TABLETS 28S] 84 tablet 0     Sig: TAKE 1 TABLET BY MOUTH DAILY       Gynecology Medication Protocol Failed - 11/8/2024  5:28 PM        Failed - Mammogram in past 12 months        Passed - PASS--PENDING LAST PAP WNL--VIA MANUAL LOOKUP        Passed - Physical or Pelvic/Breast in past 12 or next 3 mos--VIA MANUAL LOOKUP              Recent Outpatient Visits              2 days ago Routine general medical examination at a health care facility    [facility], [place], Minhxuyen, PA-C    Office Visit    11 months ago Allergic reaction to drug, initial encounter    [facility], Samuel Sawyer MD    Office Visit    1 year ago Routine general medical examination at a health care facility    [facility], [place], Minhxuyen, PA-C    Office Visit    1 year ago ADD (attention deficit disorder) without hyperactivity    [facility] [place], Minhxuyen, PA-C    Telemedicine    2 years ago Encounter for birth control pills maintenance    [facility], [place], Minhxuyen, PA-C    Telemedicine           Future Appointments         Provider Department Appt Notes    In 2 months Jazzy Foreman MD [facility] - OB/GYN tubal ligation consult

## 2024-11-09 RX ORDER — NORGESTIMATE AND ETHINYL ESTRADIOL 7DAYSX3 28
1 KIT ORAL DAILY
Qty: 84 TABLET | Refills: 0 | Status: SHIPPED | OUTPATIENT
Start: 2024-11-09

## 2025-01-29 DIAGNOSIS — Z30.41 ENCOUNTER FOR BIRTH CONTROL PILLS MAINTENANCE: ICD-10-CM

## 2025-01-30 RX ORDER — NORGESTIMATE AND ETHINYL ESTRADIOL 7DAYSX3 28
1 KIT ORAL DAILY
Qty: 84 TABLET | Refills: 2 | Status: SHIPPED | OUTPATIENT
Start: 2025-01-30

## 2025-01-30 NOTE — TELEPHONE ENCOUNTER
Please review. Protocol failed or has no protocol.  No mammogram has been completed.   Requested Prescriptions   Pending Prescriptions Disp Refills    TRI-SPRINTEC 0.18/0.215/0.25 MG-35 MCG Oral Tab [Pharmacy Med Name: TRI-SPRINTEC TABLETS 28S] 84 tablet 0     Sig: TAKE 1 TABLET BY MOUTH DAILY       Gynecology Medication Protocol Failed - 1/30/2025  1:55 PM        Failed - Mammogram in past 12 months        Passed - PASS--PENDING LAST PAP WNL--VIA MANUAL LOOKUP        Passed - Medication is active on med list        Passed - Physical or Pelvic/Breast in past 12 or next 3 mos--VIA MANUAL LOOKUP             Recent Outpatient Visits              2 months ago Routine general medical examination at a health care facility    [facility], [place], Minhxuyen, PA-C    Office Visit    1 year ago [facility] drug[facility]    [facility]    1 year ago Routine general medical examination at a health care facility    [facility], [place], Minhxuyen, PA-C    Office Visit    1 year ago ADD (attention deficit disorder) without hyperactivity    [facility], [place], Minhxuyen, PA-C    Telemedicine    3 years ago Encounter for birth control pills maintenance    [facility], [place], Minhxuyen, PA-C    Telemedicine

## 2025-02-06 ENCOUNTER — HOSPITAL ENCOUNTER (OUTPATIENT)
Age: 40
Discharge: HOME | End: 2025-02-06
Attending: EMERGENCY MEDICINE
Payer: OTHER GOVERNMENT

## 2025-02-06 VITALS
HEART RATE: 73 BPM | OXYGEN SATURATION: 100 % | TEMPERATURE: 97 F | DIASTOLIC BLOOD PRESSURE: 73 MMHG | SYSTOLIC BLOOD PRESSURE: 105 MMHG | RESPIRATION RATE: 18 BRPM

## 2025-02-06 DIAGNOSIS — H65.91 RIGHT OTITIS MEDIA WITH EFFUSION: Primary | ICD-10-CM

## 2025-02-06 PROCEDURE — 99213 OFFICE O/P EST LOW 20 MIN: CPT

## 2025-02-06 RX ORDER — FLUCONAZOLE 150 MG/1
150 TABLET ORAL
Qty: 3 TABLET | Refills: 0 | Status: SHIPPED | OUTPATIENT
Start: 2025-02-06

## 2025-02-06 NOTE — ED PROVIDER NOTES
Patient Seen in: [facility] [place]      History     Chief Complaint   Patient presents with    Ear Pain     Stated Complaint: ear infection, headache    Subjective:   HPI      Patient is a 39-year-old female with no significant past medical history presents now with right ear pain.  Patient states she has had worsening right ear pain over the last 5 to 6 days.  Patient denies any fever.  The patient denies any trauma to the ear.  Patient denies any drainage or hearing loss    Objective:     Past Medical History:    Decorative tattoo    History of pregnancy    missed ab,  x2, pregnancy management 2014- pitocin x2    Human papilloma virus infection    LSIL (low grade squamous intraepithelial lesion) on Pap smear    abnormal pap smear              Past Surgical History:   Procedure Laterality Date    Breast surgery Bilateral     [place] teeth removed                  Social History     Socioeconomic History    Marital status:    Tobacco Use    Smoking status: Never     Passive exposure: Never    Smokeless tobacco: Never   Vaping Use    Vaping status: Never Used   Substance and Sexual Activity    Alcohol use: Not Currently     Comment: very rare    Drug use: No   Other Topics Concern    Caffeine Concern Yes     Comment: coffee, soda 1 cup              Review of Systems    Positive for stated complaint: ear infection, headache  Other systems are as noted in HPI.  Constitutional and vital signs reviewed.      All other systems reviewed and negative except as noted above.    Physical Exam     ED Triage Vitals [25 0914]   /73   Pulse 73   Resp 18   Temp 97.1 °F (36.2 °C)   Temp src Oral   SpO2 100 %   O2 Device None (Room air)       Current Vitals:   Vital Signs  BP: 105/73  Pulse: 73  Resp: 18  Temp: 97.1 °F (36.2 °C)  Temp src: Oral    Oxygen Therapy  SpO2: 100 %  O2 Device: None (Room air)        Physical Exam    Constitutional: Well-developed well-nourished in no acute distress  Head:  Normocephalic, no swelling or tenderness  Eyes: Nonicteric sclera, no conjunctival injection  ENT: Left TM is clear and flat.  The left external auditory canal is normal.  There is no significant swelling of the right external auditory canal.  The right TM is moderately erythematous and somewhat contracted.  There is no posterior pharyngeal erythema  Chest: Clear to auscultation, no tenderness  Cardiovascular: Regular rate and rhythm without murmur  Abdomen: Soft, nontender and nondistended  Neurologic: Patient is awake, alert and oriented ×3.  The patient's motor strength is 5 out of 5 and symmetric in the upper and lower extremities bilaterally  Extremities: No focal swelling or tenderness  Skin: No pallor, no redness or warmth to the touch      ED Course   Labs Reviewed - No data to display         Will initiate p.o. Augmentin for right otitis media.  The patient also request to do fluconazole as she has developed yeast infections in the past when on antibiotics.  Will provide with ENT follow-up       MDM      Otitis externa versus otitis media        Medical Decision Making      Disposition and Plan     Clinical Impression:  1. Right otitis media with effusion         Disposition:  Discharge  2/6/2025  9:24 am    Follow-up:  Luan Boyer PA-C  [address]  [place] [id number] [phone]      As needed    Bk Yang MD  [address]  [facility] [id number] [phone]      For any persistent ear pain          Medications Prescribed:  Current Discharge Medication List        START taking these medications    Details   amoxicillin clavulanate 875-125 MG Oral Tab Take 1 tablet by mouth 2 (two) times daily for 10 days.  Qty: 20 tablet, Refills: 0      fluconazole 150 MG Oral Tab Take 1 tablet (150 mg total) by mouth Every other day PRN.  Qty: 3 tablet, Refills: 0                 Supplementary Documentation:

## 2025-02-10 ENCOUNTER — TELEPHONE (OUTPATIENT)
Dept: FAMILY MEDICINE CLINIC | Facility: CLINIC | Age: 40
End: 2025-02-10

## 2025-02-10 NOTE — TELEPHONE ENCOUNTER
Patient was seen in the  for an ear infection. She is still in pain after 5 days of antibiotics. Appointment made for tomorrow. ER flags discussed.     Future Appointments   Date Time Provider Department Center   2/11/2025 12:15 PM [place], [place]hx[place], PA-C [facility] [place]       
Patient requests all Lab, Cardiology, and Radiology Results on their Discharge Instructions

## 2025-02-11 ENCOUNTER — OFFICE VISIT (OUTPATIENT)
Dept: FAMILY MEDICINE CLINIC | Facility: CLINIC | Age: 40
End: 2025-02-11

## 2025-02-11 VITALS
DIASTOLIC BLOOD PRESSURE: 81 MMHG | HEIGHT: 62 IN | WEIGHT: 150 LBS | BODY MASS INDEX: 27.6 KG/M2 | HEART RATE: 71 BPM | SYSTOLIC BLOOD PRESSURE: 121 MMHG

## 2025-02-11 DIAGNOSIS — M26.621 ARTHRALGIA OF RIGHT TEMPOROMANDIBULAR JOINT: Primary | ICD-10-CM

## 2025-02-11 PROCEDURE — 99213 OFFICE O/P EST LOW 20 MIN: CPT | Performed by: PHYSICIAN ASSISTANT

## 2025-02-11 RX ORDER — METHYLPREDNISOLONE 4 MG/1
TABLET ORAL
Qty: 21 EACH | Refills: 0 | Status: SHIPPED | OUTPATIENT
Start: 2025-02-11

## 2025-02-11 NOTE — PROGRESS NOTES
HPI:     HPI  A 39-year-old woman is in the office for a follow-up post-ER due to AOM. Her right ear pain persists. She has been taking Tylenol without relief.  She denies fever, cough, sore throat, or ear discharge.    Medications:     Current Outpatient Medications   Medication Sig Dispense Refill    methylPREDNISolone (MEDROL) 4 MG Oral Tablet Therapy Pack As directed. 21 each 0    amoxicillin clavulanate 875-125 MG Oral Tab Take 1 tablet by mouth 2 (two) times daily for 10 days. 20 tablet 0    fluconazole 150 MG Oral Tab Take 1 tablet (150 mg total) by mouth Every other day PRN. 3 tablet 0    Norgestim-Eth Estrad Triphasic (TRI-SPRINTEC) 0.18/0.215/0.25 MG-35 MCG Oral Tab Take 1 tablet by mouth daily. 84 tablet 2    amphetamine-dextroamphetamine 5 MG Oral Tab Take 1 tablet (5 mg total) by mouth 2 (two) times daily. 60 tablet 0    Multiple Vitamins-Minerals (MULTI-VITAMIN/MINERALS) Oral Tab Take 1 tablet by mouth daily.         Allergies:   Allergies[1]    History:     Health Maintenance   Topic Date Due    Mammogram  Never done    COVID-19 Vaccine ( season) Never done    Annual Depression Screening  2025    Influenza Vaccine (1) 2025 (Originally 10/1/2024)    Annual Physical  2025    Pap Smear  2026    DTaP,Tdap,and Td Vaccines (2 - Td or Tdap) 2027    Pneumococcal Vaccine: Birth to 50yrs  Aged Out    Meningococcal B Vaccine  Aged Out       No LMP recorded. (Menstrual status: Continuous Pill).   Past Medical History:     Past Medical History:    Decorative tattoo    History of pregnancy    missed ab,  x2, pregnancy management 2014- pitocin x2    Human papilloma virus infection    LSIL (low grade squamous intraepithelial lesion) on Pap smear    abnormal pap smear       Past Surgical History:     Past Surgical History:   Procedure Laterality Date    Breast surgery Bilateral     [place] teeth removed         Family History:     Family History   Problem Relation Age of  Onset    Musculo-skelatal Disorder Mother         multiple sclerosis    Stroke Mother     Lipids Mother     Hypertension Maternal Grandmother     Skin cancer Maternal Grandmother         basal cell    Lipids Maternal Grandmother     Skin cancer Brother         melanoma    Diabetes Brother     Psychiatric Maternal Uncle         mental illness, paranoid schizophrenia    Colon Cancer Maternal Grandfather        Social History:     Social History     Socioeconomic History    Marital status:      Spouse name: Not on file    Number of children: Not on file    Years of education: Not on file    Highest education level: Not on file   Occupational History    Not on file   Tobacco Use    Smoking status: Never     Passive exposure: Never    Smokeless tobacco: Never   Vaping Use    Vaping status: Never Used   Substance and Sexual Activity    Alcohol use: Not Currently     Comment: very rare    Drug use: No    Sexual activity: Not on file   Other Topics Concern     Service Not Asked    Blood Transfusions Not Asked    Caffeine Concern Yes     Comment: coffee, soda 1 cup    Occupational Exposure Not Asked    Hobby Hazards Not Asked    Sleep Concern Not Asked    Stress Concern Not Asked    Weight Concern Not Asked    Special Diet Not Asked    Back Care Not Asked    Exercise Not Asked    Bike Helmet Not Asked    Seat Belt Not Asked    Self-Exams Not Asked   Social History Narrative    Not on file     Social Drivers of Health     Food Insecurity: Not on file   Transportation Needs: Not on file   Stress: Not on file   Housing Stability: Not on file       Review of Systems:   Review of Systems   HENT:  Positive for ear pain.    Neurological:  Positive for headaches.        Vitals:    02/11/25 1220   BP: 121/81   Pulse: 71   Weight: 150 lb (68 kg)   Height: 5' 2\" (1.575 m)     Body mass index is 27.44 kg/m².    Physical Exam:   Physical Exam  Vitals reviewed.   Constitutional:       General: She is not in acute  distress.     Appearance: She is well-developed.   HENT:      Right Ear: Tympanic membrane, ear canal and external ear normal. There is no impacted cerumen.      Left Ear: Tympanic membrane, ear canal and external ear normal. There is no impacted cerumen.      Nose: Nose normal.      Mouth/Throat:      Mouth: Mucous membranes are moist.      Pharynx: Oropharynx is clear. No oropharyngeal exudate or posterior oropharyngeal erythema.   Eyes:      General:         Right eye: No discharge.         Left eye: No discharge.      Conjunctiva/sclera: Conjunctivae normal.   Cardiovascular:      Rate and Rhythm: Normal rate and regular rhythm.      Heart sounds: Normal heart sounds, S1 normal and S2 normal. No murmur heard.  Pulmonary:      Effort: Pulmonary effort is normal.      Breath sounds: Normal breath sounds. No wheezing or rales.   Chest:      Chest wall: No tenderness.   Lymphadenopathy:      Cervical: No cervical adenopathy.   Skin:     Findings: No rash.   Neurological:      Mental Status: She is alert and oriented to person, place, and time.   Psychiatric:         Behavior: Behavior is cooperative.      + right TMJ tenderness noted.    Assessment and Plan::     Assessment & Plan  Arthralgia of right temporomandibular joint    Orders:    methylPREDNISolone (MEDROL) 4 MG Oral Tablet Therapy Pack; As directed.    Advise the patient to follow up with Dentist if symptom worsen or persists.     Discussed plan of care with pt and pt is in agreement.All questions answered. Pt to call with questions or concerns.         [1]   Allergies  Allergen Reactions    Alcohol HIVES     October 2023 (after 2 shots fireball)    Motrin [Ibuprofen] ANGIOEDEMA    Seasonal ITCHING     Congestion

## 2025-03-17 DIAGNOSIS — F90.0 ATTENTION DEFICIT HYPERACTIVITY DISORDER (ADHD), PREDOMINANTLY INATTENTIVE TYPE: ICD-10-CM

## 2025-03-17 RX ORDER — DEXTROAMPHETAMINE SACCHARATE, AMPHETAMINE ASPARTATE, DEXTROAMPHETAMINE SULFATE AND AMPHETAMINE SULFATE 1.25; 1.25; 1.25; 1.25 MG/1; MG/1; MG/1; MG/1
5 TABLET ORAL 2 TIMES DAILY
Qty: 60 TABLET | Refills: 0 | Status: SHIPPED | OUTPATIENT
Start: 2025-03-17

## 2025-03-17 NOTE — TELEPHONE ENCOUNTER
Patient requesting refill for amphetamine-dextroamphetamine 5 MG Oral Tab     [id number] - [place], [place] - [address] AT [facility] [address], [phone], [phone]   
Recent Fills: 11/06/2024, 12/19/2024, 01/29/2025    Last Rx Written: 01/07/2025    Last Office Visit: 02/11/2025    
Yes

## 2025-04-24 DIAGNOSIS — F90.0 ATTENTION DEFICIT HYPERACTIVITY DISORDER (ADHD), PREDOMINANTLY INATTENTIVE TYPE: ICD-10-CM

## 2025-04-24 RX ORDER — DEXTROAMPHETAMINE SACCHARATE, AMPHETAMINE ASPARTATE, DEXTROAMPHETAMINE SULFATE AND AMPHETAMINE SULFATE 1.25; 1.25; 1.25; 1.25 MG/1; MG/1; MG/1; MG/1
5 TABLET ORAL 2 TIMES DAILY
Qty: 60 TABLET | Refills: 0 | Status: SHIPPED | OUTPATIENT
Start: 2025-04-24

## 2025-04-24 NOTE — TELEPHONE ENCOUNTER
Please review. Protocol Failed; No Protocol      Recent fills: 1/29/2025, 3/17/2025  Last Rx written: 3/17/2025  Last office visit: 2/11/2025

## 2025-04-24 NOTE — TELEPHONE ENCOUNTER
Patient requesting refill has one left          [facility] #[id number] - [place], [place] - [address] AT [facility] OF [address],     Medication Detail    Medication Quantity Refills Start End   amphetamine-dextroamphetamine 5 MG Oral Tab 60 tablet 0 3/17/2025 --   Sig:   Take 1 tablet (5 mg total) by mouth 2 (two) times daily.     Route:   Oral     Earliest Fill Date:   3/17/2025     Order #:   [id number]

## 2025-05-24 ENCOUNTER — TELEPHONE (OUTPATIENT)
Dept: FAMILY MEDICINE CLINIC | Facility: CLINIC | Age: 40
End: 2025-05-24

## 2025-05-25 NOTE — TELEPHONE ENCOUNTER
Paged by pt: thinks she is having a TMJ flare, has jaw swelling, ate something yesterday that may have caused it, has had similar reaction in past, not an allergic reaction, she can't take ibuprofen due to allergy (angioedema), states steroid worked in past  Informed pt my policy is to not prescribe steroids unless I can see and eval patient myself  Recommend icing AA and tylenol prn,  go IC (or ER) for eval and treatment    Go to the emergency room or call 911 for any new or suddenly worsening symptoms, any signs of acute distress, angioedema, respiratory distress, or emergent changes.

## 2025-06-07 ENCOUNTER — TELEPHONE (OUTPATIENT)
Dept: FAMILY MEDICINE CLINIC | Facility: CLINIC | Age: 40
End: 2025-06-07

## 2025-06-07 DIAGNOSIS — F90.0 ATTENTION DEFICIT HYPERACTIVITY DISORDER (ADHD), PREDOMINANTLY INATTENTIVE TYPE: ICD-10-CM

## 2025-06-07 DIAGNOSIS — M26.621 ARTHRALGIA OF RIGHT TEMPOROMANDIBULAR JOINT: ICD-10-CM

## 2025-06-07 NOTE — TELEPHONE ENCOUNTER
Patient requesting refill.     Medication Quantity Refills Start End   amphetamine-dextroamphetamine 5 MG Oral Tab 60 tablet 0 4/24/2025 --   Sig:   Take 1 tablet (5 mg total) by mouth 2 (two) times daily.     Route:   Oral     Earliest Fill Date:   4/24/2025     Order #:   [id number]

## 2025-06-08 RX ORDER — DEXTROAMPHETAMINE SACCHARATE, AMPHETAMINE ASPARTATE, DEXTROAMPHETAMINE SULFATE AND AMPHETAMINE SULFATE 1.25; 1.25; 1.25; 1.25 MG/1; MG/1; MG/1; MG/1
5 TABLET ORAL 2 TIMES DAILY
Qty: 60 TABLET | Refills: 0 | Status: SHIPPED | OUTPATIENT
Start: 2025-06-08

## 2025-06-08 RX ORDER — METHYLPREDNISOLONE 4 MG/1
TABLET ORAL
Qty: 21 EACH | Refills: 0 | OUTPATIENT
Start: 2025-06-08

## 2025-06-09 NOTE — TELEPHONE ENCOUNTER
Attempted to call mobile number, no answer and no ability to leave a message. Called home number, Left message for patient to call back and discuss.     Detailed [employer] message sent as well.

## 2025-07-24 DIAGNOSIS — Z30.41 ENCOUNTER FOR BIRTH CONTROL PILLS MAINTENANCE: ICD-10-CM

## 2025-07-24 NOTE — TELEPHONE ENCOUNTER
Norgestim-Eth Estrad Triphasic (TRI-SPRINTEC) 0.18/0.215/0.25 MG-35 MCG Oral Tab, Take 1 tablet by mouth daily., Disp: 84 tablet, Rfl: 2

## 2025-07-25 RX ORDER — NORGESTIMATE AND ETHINYL ESTRADIOL 7DAYSX3 28
1 KIT ORAL DAILY
Qty: 84 TABLET | Refills: 1 | Status: SHIPPED | OUTPATIENT
Start: 2025-07-25

## 2025-07-25 NOTE — TELEPHONE ENCOUNTER
For replies, please route to pool: [facility] CENTRAL REFILLS    Please review: medication fails/has no protocol attached.  No future appointments with primary care medicine     Health Maintenance   Topic Date Due    Pap Smear  09/08/2026     Per last pap smear results:  Luan Boyer PA-C  9/15/2023 11:57 AM CDT   Pap and HPV are negative. Repeat Pap in 3 years.     Mammogram Never done  Mammogram order placed 11/6/24 - patient has not yet completed.    Last annual physical or breast/pelvic exam: 11/6/24

## (undated) NOTE — LETTER
03/09/21        Antolin Rule  [address]       Dear Margoth Maria records indicate that you have outstanding lab work and or testing that was ordered for you and has not yet been completed:  Orders Placed This Encounter      A

## (undated) NOTE — IP AVS SNAPSHOT
[address] [facility] [phone]                Discharge Summary   4/3/2017    Gill Solorio           Admission Information        Provider Department    4/3/2017 Riccardo Colbert MD [facility] 3 immediately if you experience any of the following symptoms:  · Headache that does not go away  · Visual changes (seeing spots, blurred vision or partial vision loss)  · Feeling nauseated or vomiting  · Stomach pain/heartburn  · Swelling in your hands, fee 2.6 (04/04/17)  0.8 (04/04/17)  0.2 (04/04/17)  0.1    (03/15/17)  73 (03/15/17)  20 (03/15/17)  5 (03/15/17)  1 (03/15/17)  0  (03/15/17)  6.7 (03/15/17)  1.9 (03/15/17)  0.5 (03/15/17)  0.1 (03/15/17)  0.0      Radiology Exams     None      Patient Sajan Su - If you are a smoker or have smoked in the last 12 months, we encourage you to explore options for quitting.     - If you have concerns related to behavioral health issues or thoughts of harming yourself, contact [address] [facility] a

## (undated) NOTE — MR AVS SNAPSHOT
[facility]  [address] [place] [address] [phone]               Thank you for choosing us for your health care visit with Renzo Flores.  Juaquin Ospina MD.  We are glad to serve you and happy to provide you with this summary of your vis visit,  view other health information, and more. To sign up or find more information, go to [email or web address] and click on the Sign Up Now link in the [employer] box.      Enter your "[employer] Activation Code exactly as it appears below along with yo

## (undated) NOTE — MR AVS SNAPSHOT
[facility]  [address] [place] [address] [phone]               Thank you for choosing us for your health care visit with Cayla Dow MD.  We are glad to serve you and happy to provide you with this summary of your visit. PH, URINE 7.0 4.5 - 8.0    PROTEIN (URINE DIPSTICK) negative Negative/Trace mg/dL    UROBILINOGEN,SEMI-QN 0.0 0.0 - 1.9 mg/dL    NITRITE, URINE negative Negative    LEUKOCYTES negative Negative    APPEARANCE clear Clear    URINE-COLOR yellow Yellow    Mul

## (undated) NOTE — Clinical Note
AUTHORIZATION FOR SURGICAL OPERATION OR OTHER PROCEDURE    1.  I hereby authorize Dr. Александр Olmstead, and [facility] staff assigned to my case to perform the following operation and/or procedure at [facility]:    ________________ Time:  ________ A. M.  P.M.        Patient Name:  ______________________________________________________  (please print)      Patient signature:  ___________________________________________________             Relationship to Patient:

## (undated) NOTE — MR AVS SNAPSHOT
[address] [facility]  [address] [address]  [phone]               Thank you for choosing us for your health care visit with Daria Maher.  Lexy Leach MD.  We are glad to serve you and happy to provide you with this summary of your visit [employer] Breast Pump           B-12 100 MCG Tabs   Take by mouth. MULTI PRENATAL 27-0.8 MG Tabs   Take  by mouth.                    Results of Recent Testing     URINALYSIS NONAUTO W/O SCOPE      Component Value Standard Range & Units    G EAT THESE FOODS MORE OFTEN: EAT THESE FOODS LESS OFTEN:   Make half your plate fruits and vegetables Highly refined, white starches including white bread, rice and pasta   Eat plenty of protein, keep the fat content low Sugars:  sodas and sports drinks, ca

## (undated) NOTE — LETTER
10/09/20        Debora Shah  [address]       Dear Gm Silva records indicate that you have outstanding lab work and or testing that was ordered for you and has not yet been completed:  Orders Placed This Encounter      A

## (undated) NOTE — LETTER
01/09/21        Victor Manuel Esteban  [address]       Dear Riccardo Duel records indicate that you have outstanding lab work and or testing that was ordered for you and has not yet been completed:  Orders Placed This Encounter      A

## (undated) NOTE — MR AVS SNAPSHOT
[facility]  [address] [place] [address]  [phone]               Thank you for choosing us for your health care visit with Pedro Luis Jaime MD.  We are glad to serve you and happy to provide you with this summary of your vis Pregnancy Test, Urine Negative     Control Line Present with a clear background (yes/no) Yes Yes/No    Kit Lot # [id number] Numeric    Kit Expiration Date 04/10/2018 Date                  [employer]     Sign up for [employer], your secure online medical record.   My